# Patient Record
Sex: MALE | Race: WHITE | Employment: FULL TIME | URBAN - NONMETROPOLITAN AREA
[De-identification: names, ages, dates, MRNs, and addresses within clinical notes are randomized per-mention and may not be internally consistent; named-entity substitution may affect disease eponyms.]

---

## 2017-08-19 ENCOUNTER — NURSE ONLY (OUTPATIENT)
Dept: FAMILY MEDICINE CLINIC | Facility: CLINIC | Age: 37
End: 2017-08-19

## 2017-08-19 DIAGNOSIS — R79.89 ELEVATED TSH: ICD-10-CM

## 2017-08-19 LAB
FREE T4: 0.8 NG/DL (ref 0.9–1.8)
TSI SER-ACNC: 4.41 MIU/ML (ref 0.35–5.5)

## 2017-08-19 PROCEDURE — 36415 COLL VENOUS BLD VENIPUNCTURE: CPT | Performed by: FAMILY MEDICINE

## 2017-08-19 PROCEDURE — 84439 ASSAY OF FREE THYROXINE: CPT | Performed by: FAMILY MEDICINE

## 2017-08-19 PROCEDURE — 84443 ASSAY THYROID STIM HORMONE: CPT | Performed by: FAMILY MEDICINE

## 2017-08-21 DIAGNOSIS — R79.89 ELEVATED TSH: Primary | ICD-10-CM

## 2017-10-20 RX ORDER — ALBUTEROL SULFATE 90 UG/1
2 AEROSOL, METERED RESPIRATORY (INHALATION) EVERY 4 HOURS PRN
Qty: 1 INHALER | Refills: 0 | Status: SHIPPED | OUTPATIENT
Start: 2017-10-20 | End: 2017-12-09

## 2017-11-30 NOTE — TELEPHONE ENCOUNTER
Albuterol Sulfate HFA (PROAIR HFA) 108 (90 Base) MCG/ACT Inhalation Aero Soln   Call to [facility] in [place]

## 2017-12-01 RX ORDER — ALBUTEROL SULFATE 90 UG/1
2 AEROSOL, METERED RESPIRATORY (INHALATION) EVERY 4 HOURS PRN
Qty: 1 INHALER | Refills: 0 | OUTPATIENT
Start: 2017-12-01

## 2017-12-01 NOTE — TELEPHONE ENCOUNTER
Please advise patient that he needs an office visit. He has gone through 1 albuterol inhaler in a month.

## 2017-12-01 NOTE — TELEPHONE ENCOUNTER
REGGIE----spoke with pt. States he has been using the albuterol most days in the morning. He has ~1/2 left of the one that was refilled on 10/20/17, but was hoping to leave one at work and keep one at home---that is why he called for a refill now.   Advised he

## 2017-12-09 ENCOUNTER — OFFICE VISIT (OUTPATIENT)
Dept: FAMILY MEDICINE CLINIC | Facility: CLINIC | Age: 37
End: 2017-12-09

## 2017-12-09 VITALS
TEMPERATURE: 99 F | HEART RATE: 90 BPM | WEIGHT: 201.13 LBS | SYSTOLIC BLOOD PRESSURE: 128 MMHG | DIASTOLIC BLOOD PRESSURE: 82 MMHG | HEIGHT: 71 IN | BODY MASS INDEX: 28.16 KG/M2 | OXYGEN SATURATION: 98 %

## 2017-12-09 DIAGNOSIS — J30.1 CHRONIC SEASONAL ALLERGIC RHINITIS DUE TO POLLEN: ICD-10-CM

## 2017-12-09 DIAGNOSIS — J45.990 EXERCISE-INDUCED ASTHMA: Primary | ICD-10-CM

## 2017-12-09 PROCEDURE — 99213 OFFICE O/P EST LOW 20 MIN: CPT | Performed by: FAMILY MEDICINE

## 2017-12-09 RX ORDER — MONTELUKAST SODIUM 10 MG/1
10 TABLET ORAL NIGHTLY
Qty: 30 TABLET | Refills: 1 | Status: SHIPPED | OUTPATIENT
Start: 2017-12-09 | End: 2021-06-19 | Stop reason: ALTCHOICE

## 2017-12-09 RX ORDER — ALBUTEROL SULFATE 90 UG/1
2 AEROSOL, METERED RESPIRATORY (INHALATION) EVERY 4 HOURS PRN
Qty: 1 INHALER | Refills: 2 | Status: SHIPPED | OUTPATIENT
Start: 2017-12-09 | End: 2020-10-21

## 2017-12-09 NOTE — PROGRESS NOTES
HPI:    Patient ID: Bing Bullard is a 30year old male. Patient presents with:  Wheezing: Wheezing when breathing once a week. Nick Artis     HPI c/o intermittent wheezing at work with exposure to royal jelly powder and pollen, certain honey  Running , c/o wheezing lb 2 oz, SpO2 98 %.          ASSESSMENT/PLAN:   Exercise-induced asthma  (primary encounter diagnosis)  Chronic seasonal allergic rhinitis due to pollen  Patient Instructions   I reviewed pathophysiology of exercise-induced asthma as well as airway hyper re

## 2017-12-09 NOTE — PATIENT INSTRUCTIONS
I reviewed pathophysiology of exercise-induced asthma as well as airway hyper reactivity. Discussed allergen avoidance strategies. Discussed the addition of a controller medication.   Will start Singulair 10 mg nightly  Patient may use albuterol 2 puffs 3

## 2018-01-03 ENCOUNTER — CHARTING TRANS (OUTPATIENT)
Dept: URGENT CARE | Age: 38
End: 2018-01-03

## 2018-01-03 ENCOUNTER — MYAURORA ACCOUNT LINK (OUTPATIENT)
Dept: OTHER | Age: 38
End: 2018-01-03

## 2018-01-03 ASSESSMENT — PAIN SCALES - GENERAL: PAINLEVEL_OUTOF10: 0

## 2018-01-04 ENCOUNTER — CHARTING TRANS (OUTPATIENT)
Dept: OTHER | Age: 38
End: 2018-01-04

## 2018-01-09 ENCOUNTER — CHARTING TRANS (OUTPATIENT)
Dept: OTHER | Age: 38
End: 2018-01-09

## 2018-01-24 ENCOUNTER — CHARTING TRANS (OUTPATIENT)
Dept: OTHER | Age: 38
End: 2018-01-24

## 2018-01-31 ENCOUNTER — CHARTING TRANS (OUTPATIENT)
Dept: OTHER | Age: 38
End: 2018-01-31

## 2018-02-10 NOTE — PATIENT INSTRUCTIONS
I reviewed etiology of genital warts, possible recurrence, treatment options including risks, benefits, alternatives and side effects. Discussed importance of diligent observation. Discussed conservative stress management strategies.   We will have patien Most warts go away on their own in 1 to 2 years. But it’s important to consider treatment.  Treatment can:  · Make the warts go away sooner  · Lower the risk of spreading HPV to others  · Lower the risk for cervical cancer if you’re a woman  Warts can be tr The HPV virus is passed on to other people by having sex with someone who is infected. The risk of passing on the virus is greatest when you have warts. But there is a chance of spreading the virus even after treatment, when the wart can’t be seen.  Condoms

## 2018-02-10 NOTE — PROGRESS NOTES
Dante Yusuf is a 30year old male. Patient presents with:  Asthma: f/u    HPI:   2 1/2 months ago went to [employer], diagnosed with genital wart, very stressed, got it treated by dermatologist, patient states ever since that time he just \"does not feel n GENERAL: well developed, well nourished,in no apparent distress, well hydrated  SKIN: no rashes,no suspicious lesions  LUNGS: clear to auscultation, no w/r/r  CARDIO: RRR without murmur, peripheral pulses intact  GI: good BS's,no masses, HSM or tenderness Genital warts are caused by the human papillomavirus (HPV). HPV is the most common sexually transmitted disease in the [place] Most people who become infected with HPV will not have any warts, but they can still pass the virus on to another person.  HPV is pas You can get an HPV vaccine. The vaccine protects against certain types of HPV that cause genital warts and cervical cancer. Even though you have HPV now, being vaccinated could still help protect you from getting other types of HPV in the future.  This is t When you first learn that you have genital warts, you may feel guilty, angry, or emotionally upset. Getting the facts helps put you back in control. Follow up with your healthcare provider or your local public health department.  You can get a complete STD

## 2018-07-22 ENCOUNTER — MYAURORA ACCOUNT LINK (OUTPATIENT)
Dept: OTHER | Age: 38
End: 2018-07-22

## 2018-07-22 ENCOUNTER — LAB SERVICES (OUTPATIENT)
Dept: OTHER | Age: 38
End: 2018-07-22

## 2018-07-22 ENCOUNTER — CHARTING TRANS (OUTPATIENT)
Dept: OTHER | Age: 38
End: 2018-07-22

## 2018-07-22 ENCOUNTER — IMAGING SERVICES (OUTPATIENT)
Dept: OTHER | Age: 38
End: 2018-07-22

## 2018-07-22 LAB
ALBUMIN SERPL-MCNC: 4.5 G/DL (ref 3.6–5.1)
ALP SERPL-CCNC: 62 U/L (ref 45–115)
ALT SERPL-CCNC: 38 U/L (ref 17–47)
AST SERPL-CCNC: 43 U/L (ref 14–43)
BASOPHIL %: 0.7 % (ref 0–1.2)
BASOPHIL ABSOLUTE #: 0 10*3/UL (ref 0–0.1)
BILIRUB SERPL-MCNC: 0.4 MG/DL (ref 0–1.3)
BUN SERPL-MCNC: 14 MG/DL (ref 6–27)
CALCIUM SERPL-MCNC: 9.8 MG/DL (ref 8.6–10.6)
CHLORIDE SERPL-SCNC: 104 MMOL/L (ref 96–107)
CO2 SERPL-SCNC: 29 MMOL/L (ref 22–32)
CREAT SERPL-MCNC: 0.8 MG/DL (ref 0.6–1.6)
DIFFERENTIAL TYPE: NORMAL
EOSINOPHIL %: 7.4 % (ref 0–10)
EOSINOPHIL ABSOLUTE #: 0.4 10*3/UL (ref 0–0.5)
GFR SERPL CREATININE-BSD FRML MDRD: >60 ML/MIN/{1.73M2}
GFR SERPL CREATININE-BSD FRML MDRD: >60 ML/MIN/{1.73M2}
GLUCOSE SERPL-MCNC: 90 MG/DL (ref 70–200)
HEMATOCRIT: 45.5 % (ref 40–51)
HEMOGLOBIN: 15.1 G/DL (ref 13.7–17.5)
INFECTIOUS MONONUCLEOSIS SCRN: NEGATIVE
LDH SERPL P TO L-CCNC: 448 U/L (ref 313–618)
LYMPH PERCENT: 29.5 % (ref 20.5–51.1)
LYMPHOCYTE ABSOLUTE #: 1.6 10*3/UL (ref 1.2–3.4)
MEAN CORPUSCULAR HGB CONCENTRATION: 33.2 % (ref 32–36)
MEAN CORPUSCULAR HGB: 30.2 PG (ref 27–34)
MEAN CORPUSCULAR VOLUME: 91 FL (ref 79–95)
MEAN PLATELET VOLUME: 9.6 FL (ref 8.6–12.4)
MONOCYTE ABSOLUTE #: 0.4 10*3/UL (ref 0.2–0.9)
MONOCYTE PERCENT: 7.6 % (ref 4.3–12.9)
NEUTROPHIL ABSOLUTE #: 3.1 10*3/UL (ref 1.4–6.5)
NEUTROPHIL PERCENT: 54.8 % (ref 34–73.5)
PLATELET COUNT: 290 10*3/UL (ref 150–400)
POTASSIUM SERPL-SCNC: 5.1 MMOL/L (ref 3.5–5.3)
PROT SERPL-MCNC: 7.8 G/DL (ref 6.4–8.5)
RED BLOOD CELL COUNT: 5 10*6/UL (ref 3.9–5.7)
RED CELL DISTRIBUTION WIDTH: 12 % (ref 11.3–14.8)
SODIUM SERPL-SCNC: 144 MMOL/L (ref 136–146)
WHITE BLOOD CELL COUNT: 5.6 10*3/UL (ref 4–10)

## 2018-07-22 ASSESSMENT — PAIN SCALES - GENERAL: PAINLEVEL_OUTOF10: 0

## 2018-08-22 ENCOUNTER — CHARTING TRANS (OUTPATIENT)
Dept: OTHER | Age: 38
End: 2018-08-22

## 2018-09-15 ENCOUNTER — CHARTING TRANS (OUTPATIENT)
Dept: OTHER | Age: 38
End: 2018-09-15

## 2018-10-13 ENCOUNTER — CHARTING TRANS (OUTPATIENT)
Dept: OTHER | Age: 38
End: 2018-10-13

## 2018-10-13 ENCOUNTER — MYAURORA ACCOUNT LINK (OUTPATIENT)
Dept: OTHER | Age: 38
End: 2018-10-13

## 2018-11-14 ENCOUNTER — CHARTING TRANS (OUTPATIENT)
Dept: OTHER | Age: 38
End: 2018-11-14

## 2018-11-27 VITALS
RESPIRATION RATE: 16 BRPM | OXYGEN SATURATION: 100 % | HEART RATE: 64 BPM | TEMPERATURE: 97.7 F | SYSTOLIC BLOOD PRESSURE: 153 MMHG | DIASTOLIC BLOOD PRESSURE: 88 MMHG

## 2019-01-05 ENCOUNTER — OFFICE VISIT (OUTPATIENT)
Dept: DERMATOLOGY | Age: 39
End: 2019-01-05

## 2019-01-05 DIAGNOSIS — A63.0 CONDYLOMA ACUMINATUM: Primary | ICD-10-CM

## 2019-01-05 PROCEDURE — 99212 OFFICE O/P EST SF 10 MIN: CPT | Performed by: DERMATOLOGY

## 2019-01-05 PROCEDURE — 17110 DESTRUCTION B9 LES UP TO 14: CPT | Performed by: DERMATOLOGY

## 2019-01-05 RX ORDER — ALBUTEROL SULFATE 90 UG/1
2 AEROSOL, METERED RESPIRATORY (INHALATION)
COMMUNITY
Start: 2016-03-07

## 2019-03-05 VITALS
TEMPERATURE: 97.1 F | RESPIRATION RATE: 20 BRPM | SYSTOLIC BLOOD PRESSURE: 110 MMHG | HEART RATE: 63 BPM | DIASTOLIC BLOOD PRESSURE: 80 MMHG | OXYGEN SATURATION: 100 %

## 2019-04-27 ENCOUNTER — OFFICE VISIT (OUTPATIENT)
Dept: DERMATOLOGY | Age: 39
End: 2019-04-27

## 2019-04-27 DIAGNOSIS — L50.9 URTICARIA: Primary | ICD-10-CM

## 2019-04-27 PROCEDURE — 99213 OFFICE O/P EST LOW 20 MIN: CPT | Performed by: DERMATOLOGY

## 2019-04-27 RX ORDER — DESONIDE 0.5 MG/ML
LOTION TOPICAL DAILY
Qty: 118 ML | Refills: 2 | Status: SHIPPED | OUTPATIENT
Start: 2019-04-27

## 2019-07-01 ENCOUNTER — WALK IN (OUTPATIENT)
Dept: URGENT CARE | Age: 39
End: 2019-07-01

## 2019-07-01 DIAGNOSIS — R06.02 SOB (SHORTNESS OF BREATH): Primary | ICD-10-CM

## 2019-07-01 PROCEDURE — 93000 ELECTROCARDIOGRAM COMPLETE: CPT | Performed by: FAMILY MEDICINE

## 2019-07-01 PROCEDURE — 99213 OFFICE O/P EST LOW 20 MIN: CPT | Performed by: FAMILY MEDICINE

## 2019-07-01 ASSESSMENT — PAIN SCALES - GENERAL: PAINLEVEL: 5-6

## 2019-10-11 ENCOUNTER — OFFICE VISIT (OUTPATIENT)
Dept: DERMATOLOGY | Age: 39
End: 2019-10-11

## 2019-10-11 DIAGNOSIS — B07.8 OTHER VIRAL WARTS: Primary | ICD-10-CM

## 2019-10-11 PROCEDURE — 17110 DESTRUCTION B9 LES UP TO 14: CPT | Performed by: DERMATOLOGY

## 2019-10-11 PROCEDURE — 99213 OFFICE O/P EST LOW 20 MIN: CPT | Performed by: DERMATOLOGY

## 2020-05-22 ENCOUNTER — TELEPHONE (OUTPATIENT)
Dept: DERMATOLOGY | Age: 40
End: 2020-05-22

## 2020-06-10 ENCOUNTER — OFFICE VISIT (OUTPATIENT)
Dept: FAMILY MEDICINE CLINIC | Facility: CLINIC | Age: 40
End: 2020-06-10
Payer: COMMERCIAL

## 2020-06-10 VITALS
WEIGHT: 208 LBS | DIASTOLIC BLOOD PRESSURE: 76 MMHG | SYSTOLIC BLOOD PRESSURE: 120 MMHG | HEIGHT: 71 IN | OXYGEN SATURATION: 98 % | BODY MASS INDEX: 29.12 KG/M2 | HEART RATE: 83 BPM | TEMPERATURE: 98 F

## 2020-06-10 DIAGNOSIS — Z00.00 LABORATORY EXAM ORDERED AS PART OF ROUTINE GENERAL MEDICAL EXAMINATION: ICD-10-CM

## 2020-06-10 DIAGNOSIS — J45.990 EXERCISE-INDUCED ASTHMA: ICD-10-CM

## 2020-06-10 DIAGNOSIS — Z13.220 SCREENING, LIPID: ICD-10-CM

## 2020-06-10 DIAGNOSIS — Z86.19 HISTORY OF GENITAL WARTS: ICD-10-CM

## 2020-06-10 DIAGNOSIS — Z00.00 PREVENTATIVE HEALTH CARE: Primary | ICD-10-CM

## 2020-06-10 DIAGNOSIS — R79.89 ELEVATED TSH: ICD-10-CM

## 2020-06-10 DIAGNOSIS — F41.9 ANXIETY: ICD-10-CM

## 2020-06-10 DIAGNOSIS — Z13.0 SCREENING, ANEMIA, DEFICIENCY, IRON: ICD-10-CM

## 2020-06-10 PROCEDURE — 99395 PREV VISIT EST AGE 18-39: CPT | Performed by: FAMILY MEDICINE

## 2020-06-10 NOTE — PATIENT INSTRUCTIONS
I reviewed age-appropriate preventive health screening exams. I discussed HPV infection and genital warts as well as infection control measures. I reassured patient that I see no evidence of genital warts at this time.   I did offer him topical medication

## 2020-06-10 NOTE — H&P
Yi Mason is a 42year old male who presents for a complete physical exam.   HPI:   Pt voices concerns of recurrent genital warts. Sees derm and gets freezing  C/o left shoulder pain x 1 yr  Wants a blood test for vitamin levels  Anxiety, mainly focused feels well, no fatigue, denies excessive daytime drowsiness, good appetite  SKIN: denies any unusual skin lesions or rashes, see hpi  EYES:denies blurred vision or double vision  ENT: denies nasal congestion, PND or ST, denies snoring and reported periods edema, FROM of all joints tested  Left shoulder: Restricted flexion at 160 degrees  Targeted activation techniques provided and flexion improved to 180 degrees  NEURO: A &O X 3,cranial nerves are intact,motor and sensory are grossly intact, DTRs +2/4 UE/LE rescue inhaler prior to exercise    Oumou Ibarra.  Ayana Cardoso, FAAFP

## 2020-06-16 ENCOUNTER — TELEPHONE (OUTPATIENT)
Dept: DERMATOLOGY | Age: 40
End: 2020-06-16

## 2020-06-20 ENCOUNTER — APPOINTMENT (OUTPATIENT)
Dept: DERMATOLOGY | Age: 40
End: 2020-06-20

## 2020-07-02 ENCOUNTER — LABORATORY ENCOUNTER (OUTPATIENT)
Dept: LAB | Age: 40
End: 2020-07-02
Attending: FAMILY MEDICINE
Payer: COMMERCIAL

## 2020-07-02 DIAGNOSIS — Z13.220 SCREENING, LIPID: ICD-10-CM

## 2020-07-02 DIAGNOSIS — R79.89 ELEVATED TSH: ICD-10-CM

## 2020-07-02 DIAGNOSIS — Z13.0 SCREENING, ANEMIA, DEFICIENCY, IRON: ICD-10-CM

## 2020-07-02 DIAGNOSIS — Z00.00 LABORATORY EXAM ORDERED AS PART OF ROUTINE GENERAL MEDICAL EXAMINATION: ICD-10-CM

## 2020-07-02 LAB
ALBUMIN SERPL-MCNC: 4.2 G/DL (ref 3.4–5)
ALBUMIN/GLOB SERPL: 1.2 {RATIO} (ref 1–2)
ALP LIVER SERPL-CCNC: 62 U/L (ref 45–117)
ALT SERPL-CCNC: 31 U/L (ref 16–61)
ANION GAP SERPL CALC-SCNC: 4 MMOL/L (ref 0–18)
AST SERPL-CCNC: 25 U/L (ref 15–37)
BASOPHILS # BLD AUTO: 0.05 X10(3) UL (ref 0–0.2)
BASOPHILS NFR BLD AUTO: 1 %
BILIRUB SERPL-MCNC: 0.6 MG/DL (ref 0.1–2)
BUN BLD-MCNC: 12 MG/DL (ref 7–18)
BUN/CREAT SERPL: 12 (ref 10–20)
CALCIUM BLD-MCNC: 9.4 MG/DL (ref 8.5–10.1)
CHLORIDE SERPL-SCNC: 106 MMOL/L (ref 98–112)
CHOLEST SMN-MCNC: 208 MG/DL (ref ?–200)
CO2 SERPL-SCNC: 27 MMOL/L (ref 21–32)
CREAT BLD-MCNC: 1 MG/DL (ref 0.7–1.3)
DEPRECATED RDW RBC AUTO: 39.6 FL (ref 35.1–46.3)
EOSINOPHIL # BLD AUTO: 0.25 X10(3) UL (ref 0–0.7)
EOSINOPHIL NFR BLD AUTO: 5.2 %
ERYTHROCYTE [DISTWIDTH] IN BLOOD BY AUTOMATED COUNT: 11.8 % (ref 11–15)
GLOBULIN PLAS-MCNC: 3.6 G/DL (ref 2.8–4.4)
GLUCOSE BLD-MCNC: 75 MG/DL (ref 70–99)
HCT VFR BLD AUTO: 42.3 % (ref 39–53)
HDLC SERPL-MCNC: 47 MG/DL (ref 40–59)
HGB BLD-MCNC: 14.3 G/DL (ref 13–17.5)
IMM GRANULOCYTES # BLD AUTO: 0.01 X10(3) UL (ref 0–1)
IMM GRANULOCYTES NFR BLD: 0.2 %
LDLC SERPL CALC-MCNC: 147 MG/DL (ref ?–100)
LYMPHOCYTES # BLD AUTO: 1.95 X10(3) UL (ref 1–4)
LYMPHOCYTES NFR BLD AUTO: 40.3 %
M PROTEIN MFR SERPL ELPH: 7.8 G/DL (ref 6.4–8.2)
MCH RBC QN AUTO: 31.2 PG (ref 26–34)
MCHC RBC AUTO-ENTMCNC: 33.8 G/DL (ref 31–37)
MCV RBC AUTO: 92.2 FL (ref 80–100)
MONOCYTES # BLD AUTO: 0.37 X10(3) UL (ref 0.1–1)
MONOCYTES NFR BLD AUTO: 7.6 %
NEUTROPHILS # BLD AUTO: 2.21 X10 (3) UL (ref 1.5–7.7)
NEUTROPHILS # BLD AUTO: 2.21 X10(3) UL (ref 1.5–7.7)
NEUTROPHILS NFR BLD AUTO: 45.7 %
NONHDLC SERPL-MCNC: 161 MG/DL (ref ?–130)
OSMOLALITY SERPL CALC.SUM OF ELEC: 282 MOSM/KG (ref 275–295)
PATIENT FASTING Y/N/NP: YES
PATIENT FASTING Y/N/NP: YES
PLATELET # BLD AUTO: 308 10(3)UL (ref 150–450)
POTASSIUM SERPL-SCNC: 4 MMOL/L (ref 3.5–5.1)
RBC # BLD AUTO: 4.59 X10(6)UL (ref 4.3–5.7)
SODIUM SERPL-SCNC: 137 MMOL/L (ref 136–145)
T4 FREE SERPL-MCNC: 0.8 NG/DL (ref 0.8–1.7)
TRIGL SERPL-MCNC: 69 MG/DL (ref 30–149)
TSI SER-ACNC: 11 MIU/ML (ref 0.36–3.74)
VLDLC SERPL CALC-MCNC: 14 MG/DL (ref 0–30)
WBC # BLD AUTO: 4.8 X10(3) UL (ref 4–11)

## 2020-07-02 PROCEDURE — 80061 LIPID PANEL: CPT | Performed by: FAMILY MEDICINE

## 2020-07-02 PROCEDURE — 36415 COLL VENOUS BLD VENIPUNCTURE: CPT | Performed by: FAMILY MEDICINE

## 2020-07-02 PROCEDURE — 80050 GENERAL HEALTH PANEL: CPT | Performed by: FAMILY MEDICINE

## 2020-07-02 PROCEDURE — 84439 ASSAY OF FREE THYROXINE: CPT | Performed by: FAMILY MEDICINE

## 2020-07-06 ENCOUNTER — TELEPHONE (OUTPATIENT)
Dept: FAMILY MEDICINE CLINIC | Facility: CLINIC | Age: 40
End: 2020-07-06

## 2020-07-06 DIAGNOSIS — R79.89 ELEVATED TSH: Primary | ICD-10-CM

## 2020-07-06 RX ORDER — LEVOTHYROXINE SODIUM 0.05 MG/1
50 TABLET ORAL
Qty: 90 TABLET | Refills: 0 | Status: SHIPPED | OUTPATIENT
Start: 2020-07-06 | End: 2020-10-01

## 2020-07-06 NOTE — TELEPHONE ENCOUNTER
Left message for pt to call back for results.     Chemistries are all normal, lipids are creeping up, this should improve w/ 10-15# wt loss   Blood count normal but thyroid function declining into hypothyroid range, I would recommend starting replacement th

## 2020-09-26 ENCOUNTER — NURSE ONLY (OUTPATIENT)
Dept: FAMILY MEDICINE CLINIC | Facility: CLINIC | Age: 40
End: 2020-09-26
Payer: COMMERCIAL

## 2020-09-26 DIAGNOSIS — R79.89 ELEVATED TSH: ICD-10-CM

## 2020-09-26 PROCEDURE — 84439 ASSAY OF FREE THYROXINE: CPT | Performed by: FAMILY MEDICINE

## 2020-09-26 PROCEDURE — 84443 ASSAY THYROID STIM HORMONE: CPT | Performed by: FAMILY MEDICINE

## 2020-09-26 PROCEDURE — 36415 COLL VENOUS BLD VENIPUNCTURE: CPT | Performed by: FAMILY MEDICINE

## 2020-09-28 DIAGNOSIS — R79.89 ELEVATED TSH: Primary | ICD-10-CM

## 2020-10-01 RX ORDER — LEVOTHYROXINE SODIUM 0.05 MG/1
TABLET ORAL
Qty: 90 TABLET | Refills: 3 | Status: SHIPPED | OUTPATIENT
Start: 2020-10-01 | End: 2021-11-01

## 2020-10-01 NOTE — TELEPHONE ENCOUNTER
Last refill #90 on 7/6/2020  Last office visit on 6/10/2020  No future appointments.   Labs current until 9/2021  Refilled per protocol

## 2020-10-20 ENCOUNTER — PATIENT MESSAGE (OUTPATIENT)
Dept: FAMILY MEDICINE CLINIC | Facility: CLINIC | Age: 40
End: 2020-10-20

## 2020-10-20 NOTE — TELEPHONE ENCOUNTER
From: Paulie Price  To: Miya Adam. Hiro Lackey DO  Sent: 10/20/2020 5:07 PM CDT  Subject: Prescription Question    Hello,     Could I please get a refill on my pro air albuterol sulfate inhaler. I don't use it often but it comes in handy this time of year.  Sparkle Ferreira

## 2020-10-21 RX ORDER — ALBUTEROL SULFATE 90 UG/1
2 AEROSOL, METERED RESPIRATORY (INHALATION) EVERY 4 HOURS PRN
Qty: 1 INHALER | Refills: 2 | Status: SHIPPED | OUTPATIENT
Start: 2020-10-21 | End: 2021-08-02

## 2020-12-22 ENCOUNTER — HOSPITAL ENCOUNTER (OUTPATIENT)
Dept: GENERAL RADIOLOGY | Age: 40
Discharge: HOME | End: 2020-12-22
Attending: FAMILY MEDICINE
Payer: COMMERCIAL

## 2020-12-22 DIAGNOSIS — M25.512 LEFT SHOULDER PAIN, UNSPECIFIED CHRONICITY: ICD-10-CM

## 2020-12-22 PROCEDURE — 73030 X-RAY EXAM OF SHOULDER: CPT | Performed by: FAMILY MEDICINE

## 2021-01-08 ENCOUNTER — TELEPHONE (OUTPATIENT)
Dept: FAMILY MEDICINE CLINIC | Facility: CLINIC | Age: 41
End: 2021-01-08

## 2021-01-08 NOTE — TELEPHONE ENCOUNTER
RECORDS REQUEST RECEIVED 1/8/2021  FOR MOST RECENT XRAY REPORT 12/22/2020   FAX [phone]        REPORT PRINTED AND FAXED

## 2021-01-27 ENCOUNTER — TELEPHONE (OUTPATIENT)
Dept: FAMILY MEDICINE CLINIC | Facility: CLINIC | Age: 41
End: 2021-01-27

## 2021-01-27 ENCOUNTER — OFFICE VISIT (OUTPATIENT)
Dept: FAMILY MEDICINE CLINIC | Facility: CLINIC | Age: 41
End: 2021-01-27
Payer: COMMERCIAL

## 2021-01-27 VITALS
BODY MASS INDEX: 29.26 KG/M2 | HEART RATE: 70 BPM | TEMPERATURE: 98 F | DIASTOLIC BLOOD PRESSURE: 76 MMHG | WEIGHT: 209 LBS | HEIGHT: 71 IN | OXYGEN SATURATION: 98 % | SYSTOLIC BLOOD PRESSURE: 120 MMHG

## 2021-01-27 DIAGNOSIS — H00.14 CHALAZION OF LEFT UPPER EYELID: Primary | ICD-10-CM

## 2021-01-27 PROCEDURE — 3078F DIAST BP <80 MM HG: CPT | Performed by: FAMILY MEDICINE

## 2021-01-27 PROCEDURE — 3074F SYST BP LT 130 MM HG: CPT | Performed by: FAMILY MEDICINE

## 2021-01-27 PROCEDURE — 3008F BODY MASS INDEX DOCD: CPT | Performed by: FAMILY MEDICINE

## 2021-01-27 PROCEDURE — 99213 OFFICE O/P EST LOW 20 MIN: CPT | Performed by: FAMILY MEDICINE

## 2021-01-27 RX ORDER — DOXYCYCLINE HYCLATE 100 MG/1
100 CAPSULE ORAL 2 TIMES DAILY
Qty: 14 CAPSULE | Refills: 0 | Status: SHIPPED | OUTPATIENT
Start: 2021-01-27 | End: 2021-02-03

## 2021-01-27 RX ORDER — CEPHALEXIN 500 MG/1
500 CAPSULE ORAL 3 TIMES DAILY
Qty: 21 CAPSULE | Refills: 0 | Status: SHIPPED | OUTPATIENT
Start: 2021-01-27 | End: 2021-01-27 | Stop reason: SINTOL

## 2021-01-27 NOTE — PROGRESS NOTES
HPI:    Patient ID: Hans Romero is a 47year old male.     Patient presents with:  Eyelid Swelling: left eye x 1 week    Left upper lid swollen x 1 week, pt has been using warm compresses, no DC, no pain  No uri symptoms    Past Medical History:   Diagnos Blood pressure 120/76, pulse 70, temperature 97.6 °F (36.4 °C), temperature source Temporal, height 5' 11\" (1.803 m), weight 209 lb (94.8 kg), SpO2 98 %.          ASSESSMENT/PLAN:   Chalazion of left upper eyelid  (primary encounter diagnosis)  Patient Ins

## 2021-01-27 NOTE — TELEPHONE ENCOUNTER
Please advise patient that I am surprised the cephalexin caused nausea however I certainly agree with his decision not to take another dose if it made him sick.   We will ricardo chart as an intolerance to cephalexin  Would recommend no other medications today

## 2021-01-27 NOTE — TELEPHONE ENCOUNTER
The medicine that was prescribed today is making him sick. He only took one and is nervous to take anymore.   He said that you can leave a message because he is at work

## 2021-01-27 NOTE — PATIENT INSTRUCTIONS
Recommend continued warm compresses 3-4 times daily  Cephalexin 500 mg 3 times daily x7 days  Follow-up as needed

## 2021-02-17 ENCOUNTER — PATIENT MESSAGE (OUTPATIENT)
Dept: FAMILY MEDICINE CLINIC | Facility: CLINIC | Age: 41
End: 2021-02-17

## 2021-02-17 RX ORDER — ERYTHROMYCIN 5 MG/G
1 OINTMENT OPHTHALMIC 3 TIMES DAILY
Qty: 1 G | Refills: 0 | Status: SHIPPED | OUTPATIENT
Start: 2021-02-17 | End: 2021-02-22

## 2021-02-17 NOTE — TELEPHONE ENCOUNTER
From: Yi Mason  To: Mario Coe.  Mayank Listen, DO  Sent: 2/17/2021 12:05 PM CST  Subject: Non-Urgent Medical Question    Hello,    I finished the medication that was given for my eyelid and just wanted to let you know that my left eyelid is still red and puf

## 2021-02-17 NOTE — TELEPHONE ENCOUNTER
Have patient continue warm compresses, start erythromycin ointment 3 times daily to affected eye x5 days

## 2021-05-25 VITALS
OXYGEN SATURATION: 98 % | SYSTOLIC BLOOD PRESSURE: 122 MMHG | RESPIRATION RATE: 16 BRPM | TEMPERATURE: 98.3 F | DIASTOLIC BLOOD PRESSURE: 82 MMHG | HEART RATE: 62 BPM

## 2021-05-31 ENCOUNTER — OFFICE VISIT (OUTPATIENT)
Dept: FAMILY MEDICINE CLINIC | Facility: CLINIC | Age: 41
End: 2021-05-31
Payer: COMMERCIAL

## 2021-05-31 VITALS
HEIGHT: 71 IN | BODY MASS INDEX: 28 KG/M2 | SYSTOLIC BLOOD PRESSURE: 126 MMHG | RESPIRATION RATE: 16 BRPM | OXYGEN SATURATION: 99 % | WEIGHT: 200 LBS | TEMPERATURE: 98 F | DIASTOLIC BLOOD PRESSURE: 66 MMHG | HEART RATE: 75 BPM

## 2021-05-31 DIAGNOSIS — J01.00 ACUTE NON-RECURRENT MAXILLARY SINUSITIS: Primary | ICD-10-CM

## 2021-05-31 PROCEDURE — 3074F SYST BP LT 130 MM HG: CPT | Performed by: NURSE PRACTITIONER

## 2021-05-31 PROCEDURE — 3008F BODY MASS INDEX DOCD: CPT | Performed by: NURSE PRACTITIONER

## 2021-05-31 PROCEDURE — 3078F DIAST BP <80 MM HG: CPT | Performed by: NURSE PRACTITIONER

## 2021-05-31 PROCEDURE — 99213 OFFICE O/P EST LOW 20 MIN: CPT | Performed by: NURSE PRACTITIONER

## 2021-05-31 RX ORDER — PREDNISONE 20 MG/1
40 TABLET ORAL DAILY
Qty: 10 TABLET | Refills: 0 | Status: SHIPPED | OUTPATIENT
Start: 2021-05-31 | End: 2021-06-05

## 2021-05-31 NOTE — PATIENT INSTRUCTIONS
Use zyrtec or xyzal daily  Use flonase daily  Follow up with pcp if not improving      Sinusitis (No Antibiotics)    The sinuses are air-filled spaces in the bones of the face.  They connect to the inside of the nose. Sinusitis is redness and swelling (in pressure.)  · Over-the-counter antihistamines or steroid nasal sprays, if advised by the healthcare provider, may help if allergies helped cause your sinusitis.   · Use acetaminophen or ibuprofen to control pain, unless another pain medicine was prescribed

## 2021-06-04 ENCOUNTER — TELEPHONE (OUTPATIENT)
Dept: FAMILY MEDICINE CLINIC | Facility: CLINIC | Age: 41
End: 2021-06-04

## 2021-06-04 NOTE — TELEPHONE ENCOUNTER
I would not recommend oral steroids. I would recommend cool compresses, artificial tears as a wetting solution which should help improve the tear ducts  We do tear duct massages which is basically rubbing the inside of of the nose next to the eye.   If sym

## 2021-06-04 NOTE — TELEPHONE ENCOUNTER
Patient received Prednisone / steroids for his eyes last week. He is in [place] right now. His eyes are really dry, Eyekids are puffed over and tear ducts are clogged, Should he start taking the prednisone again?

## 2021-06-19 ENCOUNTER — OFFICE VISIT (OUTPATIENT)
Dept: FAMILY MEDICINE CLINIC | Facility: CLINIC | Age: 41
End: 2021-06-19
Payer: COMMERCIAL

## 2021-06-19 VITALS
BODY MASS INDEX: 28.28 KG/M2 | OXYGEN SATURATION: 99 % | DIASTOLIC BLOOD PRESSURE: 80 MMHG | HEIGHT: 71 IN | SYSTOLIC BLOOD PRESSURE: 116 MMHG | RESPIRATION RATE: 18 BRPM | TEMPERATURE: 98 F | HEART RATE: 70 BPM | WEIGHT: 202 LBS

## 2021-06-19 DIAGNOSIS — H04.123 DRY EYES, BILATERAL: ICD-10-CM

## 2021-06-19 DIAGNOSIS — E03.9 ACQUIRED HYPOTHYROIDISM: ICD-10-CM

## 2021-06-19 DIAGNOSIS — Z00.00 PREVENTATIVE HEALTH CARE: Primary | ICD-10-CM

## 2021-06-19 DIAGNOSIS — M75.22 BICEPS TENDINITIS OF LEFT UPPER EXTREMITY: ICD-10-CM

## 2021-06-19 DIAGNOSIS — Z30.09 STERILIZATION EDUCATION: ICD-10-CM

## 2021-06-19 DIAGNOSIS — Z51.81 MEDICATION MONITORING ENCOUNTER: ICD-10-CM

## 2021-06-19 DIAGNOSIS — M75.82 ROTATOR CUFF TENDINITIS, LEFT: ICD-10-CM

## 2021-06-19 DIAGNOSIS — J45.990 EXERCISE-INDUCED ASTHMA: ICD-10-CM

## 2021-06-19 DIAGNOSIS — Z00.00 LABORATORY EXAM ORDERED AS PART OF ROUTINE GENERAL MEDICAL EXAMINATION: ICD-10-CM

## 2021-06-19 DIAGNOSIS — E78.00 HYPERCHOLESTEREMIA: ICD-10-CM

## 2021-06-19 DIAGNOSIS — J30.1 SEASONAL ALLERGIC RHINITIS DUE TO POLLEN: ICD-10-CM

## 2021-06-19 DIAGNOSIS — Z86.19 HISTORY OF GENITAL WARTS: ICD-10-CM

## 2021-06-19 PROCEDURE — 80053 COMPREHEN METABOLIC PANEL: CPT | Performed by: FAMILY MEDICINE

## 2021-06-19 PROCEDURE — 3008F BODY MASS INDEX DOCD: CPT | Performed by: FAMILY MEDICINE

## 2021-06-19 PROCEDURE — 3079F DIAST BP 80-89 MM HG: CPT | Performed by: FAMILY MEDICINE

## 2021-06-19 PROCEDURE — 84443 ASSAY THYROID STIM HORMONE: CPT | Performed by: FAMILY MEDICINE

## 2021-06-19 PROCEDURE — 99395 PREV VISIT EST AGE 18-39: CPT | Performed by: FAMILY MEDICINE

## 2021-06-19 PROCEDURE — 84439 ASSAY OF FREE THYROXINE: CPT | Performed by: FAMILY MEDICINE

## 2021-06-19 PROCEDURE — 80061 LIPID PANEL: CPT | Performed by: FAMILY MEDICINE

## 2021-06-19 PROCEDURE — 3074F SYST BP LT 130 MM HG: CPT | Performed by: FAMILY MEDICINE

## 2021-06-19 NOTE — PATIENT INSTRUCTIONS
1. Preventative health care  I discussed age-appropriate preventive skin exams as well as immunizations. 2. Acquired hypothyroidism  Continue daily thyroid replacement therapy, check labs  - VENIPUNCTURE  - TSH+FREE T4; Future  - TSH+FREE T4    3.  Exerc

## 2021-06-19 NOTE — H&P
Ann Del Castillo is a 47year old male who presents for a complete physical exam.   HPI:   See ROS  Would like referral for vasectomy  Wt Readings from Last 6 Encounters:  06/19/21 : 202 lb (91.6 kg)  05/31/21 : 200 lb (90.7 kg)  01/27/21 : 209 lb (94.8 kg) Exercise: active at work, walks the dog. 4 x weekly [employer]  Diet: watches minimally,      REVIEW OF SYSTEMS:   GENERAL: generally feels well, no fatigue, denies excessive daytime drowsiness, good appetite  SKIN: denies any unusual skin lesions or rashes  EY no S3, S4, strong peripheral pulses, no peripheral edema  GI: +NBS's,no masses, HSM or tenderness  : two descended testes,no masses,no hernia,no penile lesions, no warts, ulcers or other lesions, no inguinal lymphadenopathy  BACK:  : FROM, No lateral cur Laboratory exam ordered as part of routine general medical examination    - VENIPUNCTURE  - COMP METABOLIC PANEL (14); Future  - COMP METABOLIC PANEL (14)    6. Hypercholesteremia  Reviewed goals for lipids. - VENIPUNCTURE  - LIPID PANEL;  Future  - LIPID

## 2021-08-02 RX ORDER — ALBUTEROL SULFATE 90 UG/1
2 AEROSOL, METERED RESPIRATORY (INHALATION) EVERY 4 HOURS PRN
Qty: 8.5 G | Refills: 0 | Status: SHIPPED | OUTPATIENT
Start: 2021-08-02

## 2021-11-01 RX ORDER — LEVOTHYROXINE SODIUM 0.05 MG/1
50 TABLET ORAL
Qty: 90 TABLET | Refills: 3 | Status: SHIPPED | OUTPATIENT
Start: 2021-11-01

## 2021-11-01 NOTE — TELEPHONE ENCOUNTER
LOV 06/19/2021    LAST LAB 06/19/2021    LAST RX 10/01/2020 90 tablets 3 refills    Next OV No future appointments.     PROTOCOL  Thyroid Supplements Protocol Passed 11/01/2021 03:44 PM   Protocol Details  TSH test in past 12 months    TSH value between 0.3

## 2022-01-27 ENCOUNTER — TELEPHONE (OUTPATIENT)
Dept: FAMILY MEDICINE CLINIC | Facility: CLINIC | Age: 42
End: 2022-01-27

## 2022-01-27 NOTE — TELEPHONE ENCOUNTER
Brenda Regan is calling he is wondering if Dr Karthik Mccauley would order a thyroid test for him, he has cut some stuff out of his diet and he is wanting to try and get off the levothyroxine.   Please call please leave a detailed message if he does not answer

## 2022-01-27 NOTE — TELEPHONE ENCOUNTER
Spoke with pt. His last TSH was done on 6/19/21 and pt was told to repeat it 1 year later. Pt asks if he can have it rechecked now \"in hopes he can get off levothyroxine\".   States he has made dietary changes (cut out soda, decreased alcohol, stopped

## 2022-01-31 NOTE — TELEPHONE ENCOUNTER
If patient wants to see whether or not he needs thyroid medication, checking it while he is on medication is only going to determine if he is on a therapeutic dose.   If he simply wants to stop the medication and recheck his TSH and free T4 in 2 to 3 months

## 2022-02-09 ENCOUNTER — HOSPITAL ENCOUNTER (OUTPATIENT)
Age: 42
Discharge: HOME | End: 2022-02-09
Payer: COMMERCIAL

## 2022-02-09 VITALS
HEART RATE: 71 BPM | RESPIRATION RATE: 16 BRPM | OXYGEN SATURATION: 98 % | DIASTOLIC BLOOD PRESSURE: 70 MMHG | HEIGHT: 71 IN | SYSTOLIC BLOOD PRESSURE: 132 MMHG | WEIGHT: 198 LBS | TEMPERATURE: 98 F | BODY MASS INDEX: 27.72 KG/M2

## 2022-02-09 DIAGNOSIS — R53.83 FATIGUE: Primary | ICD-10-CM

## 2022-02-09 DIAGNOSIS — Z20.822 CLOSE EXPOSURE TO COVID-19 VIRUS: ICD-10-CM

## 2022-02-09 LAB — SARS-COV-2 RNA RESP QL NAA+PROBE: NOT DETECTED

## 2022-02-09 PROCEDURE — U0002 COVID-19 LAB TEST NON-CDC: HCPCS | Performed by: PHYSICIAN ASSISTANT

## 2022-02-09 PROCEDURE — 99213 OFFICE O/P EST LOW 20 MIN: CPT | Performed by: PHYSICIAN ASSISTANT

## 2022-02-09 RX ORDER — PREDNISOLONE ACETATE 10 MG/ML
SUSPENSION/ DROPS OPHTHALMIC 4 TIMES DAILY
COMMUNITY
End: 2022-03-15 | Stop reason: ALTCHOICE

## 2022-03-09 ENCOUNTER — PATIENT MESSAGE (OUTPATIENT)
Dept: FAMILY MEDICINE CLINIC | Facility: CLINIC | Age: 42
End: 2022-03-09

## 2022-03-12 ENCOUNTER — APPOINTMENT (OUTPATIENT)
Dept: DERMATOLOGY | Age: 42
End: 2022-03-12

## 2022-03-15 ENCOUNTER — OFFICE VISIT (OUTPATIENT)
Dept: FAMILY MEDICINE CLINIC | Facility: CLINIC | Age: 42
End: 2022-03-15
Payer: COMMERCIAL

## 2022-03-15 VITALS
TEMPERATURE: 98 F | DIASTOLIC BLOOD PRESSURE: 70 MMHG | BODY MASS INDEX: 28.28 KG/M2 | SYSTOLIC BLOOD PRESSURE: 120 MMHG | WEIGHT: 202 LBS | HEIGHT: 71 IN | RESPIRATION RATE: 16 BRPM | HEART RATE: 83 BPM | OXYGEN SATURATION: 98 %

## 2022-03-15 DIAGNOSIS — G56.90 AXILLARY NEUROPATHY: ICD-10-CM

## 2022-03-15 DIAGNOSIS — L72.3 SEBACEOUS CYST: Primary | ICD-10-CM

## 2022-03-15 DIAGNOSIS — M75.22 BICEPS TENDONITIS ON LEFT: ICD-10-CM

## 2022-03-15 PROCEDURE — 99213 OFFICE O/P EST LOW 20 MIN: CPT | Performed by: FAMILY MEDICINE

## 2022-03-15 PROCEDURE — 3008F BODY MASS INDEX DOCD: CPT | Performed by: FAMILY MEDICINE

## 2022-03-15 PROCEDURE — 3078F DIAST BP <80 MM HG: CPT | Performed by: FAMILY MEDICINE

## 2022-03-15 PROCEDURE — 3074F SYST BP LT 130 MM HG: CPT | Performed by: FAMILY MEDICINE

## 2022-03-15 NOTE — PATIENT INSTRUCTIONS
I advised patient of the benign nature of the lip lesion and limited treatment options. I recommend he follow-up with his dermatologist as planned  Discussed diagnostic considerations for his chronic left axillary nerve pain. Follow-up with orthopedics as scheduled.   I advised patient I suspect that the will likely schedule a cervical MRI

## 2022-03-18 ENCOUNTER — MED REC SCAN ONLY (OUTPATIENT)
Dept: FAMILY MEDICINE CLINIC | Facility: CLINIC | Age: 42
End: 2022-03-18

## 2022-04-18 ENCOUNTER — MED REC SCAN ONLY (OUTPATIENT)
Dept: FAMILY MEDICINE CLINIC | Facility: CLINIC | Age: 42
End: 2022-04-18

## 2022-05-25 ENCOUNTER — MED REC SCAN ONLY (OUTPATIENT)
Dept: FAMILY MEDICINE CLINIC | Facility: CLINIC | Age: 42
End: 2022-05-25

## 2022-06-08 ENCOUNTER — TELEPHONE (OUTPATIENT)
Dept: FAMILY MEDICINE CLINIC | Facility: CLINIC | Age: 42
End: 2022-06-08

## 2022-07-05 ENCOUNTER — OFFICE VISIT (OUTPATIENT)
Dept: FAMILY MEDICINE CLINIC | Facility: CLINIC | Age: 42
End: 2022-07-05
Payer: COMMERCIAL

## 2022-07-05 ENCOUNTER — LABORATORY ENCOUNTER (OUTPATIENT)
Dept: LAB | Age: 42
End: 2022-07-05
Attending: FAMILY MEDICINE
Payer: COMMERCIAL

## 2022-07-05 VITALS
WEIGHT: 204 LBS | HEART RATE: 68 BPM | OXYGEN SATURATION: 99 % | HEIGHT: 71 IN | BODY MASS INDEX: 28.56 KG/M2 | RESPIRATION RATE: 16 BRPM | TEMPERATURE: 98 F | DIASTOLIC BLOOD PRESSURE: 80 MMHG | SYSTOLIC BLOOD PRESSURE: 120 MMHG

## 2022-07-05 DIAGNOSIS — J45.990 EXERCISE-INDUCED ASTHMA: ICD-10-CM

## 2022-07-05 DIAGNOSIS — M75.22 BICEPS TENDONITIS ON LEFT: ICD-10-CM

## 2022-07-05 DIAGNOSIS — Z01.818 PRE-OP EVALUATION: ICD-10-CM

## 2022-07-05 DIAGNOSIS — E03.9 ACQUIRED HYPOTHYROIDISM: ICD-10-CM

## 2022-07-05 DIAGNOSIS — Z01.818 PRE-OP EVALUATION: Primary | ICD-10-CM

## 2022-07-05 PROBLEM — U07.1 COVID-19 VIRUS INFECTION: Status: ACTIVE | Noted: 2022-06-03

## 2022-07-05 LAB
ANION GAP SERPL CALC-SCNC: 3 MMOL/L (ref 0–18)
BASOPHILS # BLD AUTO: 0.06 X10(3) UL (ref 0–0.2)
BASOPHILS NFR BLD AUTO: 1 %
BUN BLD-MCNC: 10 MG/DL (ref 7–18)
CALCIUM BLD-MCNC: 9.8 MG/DL (ref 8.5–10.1)
CHLORIDE SERPL-SCNC: 105 MMOL/L (ref 98–112)
CO2 SERPL-SCNC: 32 MMOL/L (ref 21–32)
CREAT BLD-MCNC: 1.02 MG/DL
EOSINOPHIL # BLD AUTO: 0.52 X10(3) UL (ref 0–0.7)
EOSINOPHIL NFR BLD AUTO: 8.4 %
ERYTHROCYTE [DISTWIDTH] IN BLOOD BY AUTOMATED COUNT: 12.2 %
FASTING STATUS PATIENT QL REPORTED: NO
GLUCOSE BLD-MCNC: 122 MG/DL (ref 70–99)
HCT VFR BLD AUTO: 42.7 %
HGB BLD-MCNC: 13.9 G/DL
IMM GRANULOCYTES # BLD AUTO: 0.01 X10(3) UL (ref 0–1)
IMM GRANULOCYTES NFR BLD: 0.2 %
LYMPHOCYTES # BLD AUTO: 2.35 X10(3) UL (ref 1–4)
LYMPHOCYTES NFR BLD AUTO: 37.8 %
MCH RBC QN AUTO: 31.1 PG (ref 26–34)
MCHC RBC AUTO-ENTMCNC: 32.6 G/DL (ref 31–37)
MCV RBC AUTO: 95.5 FL
MONOCYTES # BLD AUTO: 0.36 X10(3) UL (ref 0.1–1)
MONOCYTES NFR BLD AUTO: 5.8 %
NEUTROPHILS # BLD AUTO: 2.92 X10 (3) UL (ref 1.5–7.7)
NEUTROPHILS # BLD AUTO: 2.92 X10(3) UL (ref 1.5–7.7)
NEUTROPHILS NFR BLD AUTO: 46.8 %
OSMOLALITY SERPL CALC.SUM OF ELEC: 290 MOSM/KG (ref 275–295)
PLATELET # BLD AUTO: 321 10(3)UL (ref 150–450)
POTASSIUM SERPL-SCNC: 3.5 MMOL/L (ref 3.5–5.1)
RBC # BLD AUTO: 4.47 X10(6)UL
SODIUM SERPL-SCNC: 140 MMOL/L (ref 136–145)
T4 FREE SERPL-MCNC: 0.9 NG/DL (ref 0.8–1.7)
TSI SER-ACNC: 3.74 MIU/ML (ref 0.36–3.74)
WBC # BLD AUTO: 6.2 X10(3) UL (ref 4–11)

## 2022-07-05 PROCEDURE — 3074F SYST BP LT 130 MM HG: CPT | Performed by: FAMILY MEDICINE

## 2022-07-05 PROCEDURE — 99213 OFFICE O/P EST LOW 20 MIN: CPT | Performed by: FAMILY MEDICINE

## 2022-07-05 PROCEDURE — 85025 COMPLETE CBC W/AUTO DIFF WBC: CPT

## 2022-07-05 PROCEDURE — 3008F BODY MASS INDEX DOCD: CPT | Performed by: FAMILY MEDICINE

## 2022-07-05 PROCEDURE — 36415 COLL VENOUS BLD VENIPUNCTURE: CPT

## 2022-07-05 PROCEDURE — 84443 ASSAY THYROID STIM HORMONE: CPT

## 2022-07-05 PROCEDURE — 84439 ASSAY OF FREE THYROXINE: CPT

## 2022-07-05 PROCEDURE — 80048 BASIC METABOLIC PNL TOTAL CA: CPT

## 2022-07-05 PROCEDURE — 3079F DIAST BP 80-89 MM HG: CPT | Performed by: FAMILY MEDICINE

## 2022-07-06 DIAGNOSIS — E03.9 ACQUIRED HYPOTHYROIDISM: Primary | ICD-10-CM

## 2022-07-12 ENCOUNTER — ORDER TRANSCRIPTION (OUTPATIENT)
Dept: PHYSICAL THERAPY | Facility: HOSPITAL | Age: 42
End: 2022-07-12

## 2022-07-12 ENCOUNTER — APPOINTMENT (OUTPATIENT)
Dept: PHYSICAL THERAPY | Age: 42
End: 2022-07-12
Attending: ORTHOPAEDIC SURGERY
Payer: COMMERCIAL

## 2022-07-12 DIAGNOSIS — M67.814 BICEPS TENDONOSIS OF LEFT SHOULDER: ICD-10-CM

## 2022-07-12 DIAGNOSIS — Z98.890 S/P LEFT ROTATOR CUFF REPAIR: Primary | ICD-10-CM

## 2022-07-13 ENCOUNTER — MED REC SCAN ONLY (OUTPATIENT)
Dept: FAMILY MEDICINE CLINIC | Facility: CLINIC | Age: 42
End: 2022-07-13

## 2022-07-13 ENCOUNTER — TELEPHONE (OUTPATIENT)
Dept: FAMILY MEDICINE CLINIC | Facility: CLINIC | Age: 42
End: 2022-07-13

## 2022-07-13 NOTE — TELEPHONE ENCOUNTER
Cyndi Enriquez with [facility] is calling for pre op H & P and any pre op testing that was done sent to [phone]

## 2022-07-20 ENCOUNTER — ORDER TRANSCRIPTION (OUTPATIENT)
Dept: PHYSICAL THERAPY | Facility: HOSPITAL | Age: 42
End: 2022-07-20

## 2022-07-20 DIAGNOSIS — M75.112 INCOMPLETE TEAR OF LEFT ROTATOR CUFF, UNSPECIFIED WHETHER TRAUMATIC: ICD-10-CM

## 2022-07-20 DIAGNOSIS — M75.22 BICIPITAL TENDINITIS OF LEFT SHOULDER: Primary | ICD-10-CM

## 2022-08-03 ENCOUNTER — TELEPHONE (OUTPATIENT)
Dept: PHYSICAL THERAPY | Facility: HOSPITAL | Age: 42
End: 2022-08-03

## 2022-08-04 ENCOUNTER — OFFICE VISIT (OUTPATIENT)
Dept: PHYSICAL THERAPY | Age: 42
End: 2022-08-04
Attending: ORTHOPAEDIC SURGERY
Payer: COMMERCIAL

## 2022-08-04 DIAGNOSIS — Z98.890 S/P LEFT ROTATOR CUFF REPAIR: ICD-10-CM

## 2022-08-04 DIAGNOSIS — M67.814 BICEPS TENDONOSIS OF LEFT SHOULDER: ICD-10-CM

## 2022-08-04 PROCEDURE — 97110 THERAPEUTIC EXERCISES: CPT

## 2022-08-04 PROCEDURE — 97161 PT EVAL LOW COMPLEX 20 MIN: CPT

## 2022-08-05 ENCOUNTER — TELEPHONE (OUTPATIENT)
Dept: PHYSICAL THERAPY | Age: 42
End: 2022-08-05

## 2022-08-05 NOTE — TELEPHONE ENCOUNTER
Spoke with Dr. Salazar Greek nurse, who is going to fax over the post-op rehab protocol for Cody's RCT repair.

## 2022-08-09 ENCOUNTER — OFFICE VISIT (OUTPATIENT)
Dept: PHYSICAL THERAPY | Age: 42
End: 2022-08-09
Attending: ORTHOPAEDIC SURGERY
Payer: COMMERCIAL

## 2022-08-09 DIAGNOSIS — M67.814 BICEPS TENDONOSIS OF LEFT SHOULDER: ICD-10-CM

## 2022-08-09 DIAGNOSIS — Z98.890 S/P LEFT ROTATOR CUFF REPAIR: ICD-10-CM

## 2022-08-09 PROCEDURE — 97140 MANUAL THERAPY 1/> REGIONS: CPT

## 2022-08-09 PROCEDURE — 97110 THERAPEUTIC EXERCISES: CPT

## 2022-08-11 ENCOUNTER — OFFICE VISIT (OUTPATIENT)
Dept: PHYSICAL THERAPY | Age: 42
End: 2022-08-11
Attending: ORTHOPAEDIC SURGERY
Payer: COMMERCIAL

## 2022-08-11 DIAGNOSIS — Z98.890 S/P LEFT ROTATOR CUFF REPAIR: ICD-10-CM

## 2022-08-11 DIAGNOSIS — M67.814 BICEPS TENDONOSIS OF LEFT SHOULDER: ICD-10-CM

## 2022-08-11 PROCEDURE — 97140 MANUAL THERAPY 1/> REGIONS: CPT

## 2022-08-11 PROCEDURE — 97110 THERAPEUTIC EXERCISES: CPT

## 2022-08-15 ENCOUNTER — OFFICE VISIT (OUTPATIENT)
Dept: PHYSICAL THERAPY | Age: 42
End: 2022-08-15
Attending: ORTHOPAEDIC SURGERY
Payer: COMMERCIAL

## 2022-08-15 DIAGNOSIS — M67.814 BICEPS TENDONOSIS OF LEFT SHOULDER: ICD-10-CM

## 2022-08-15 DIAGNOSIS — Z98.890 S/P LEFT ROTATOR CUFF REPAIR: ICD-10-CM

## 2022-08-15 PROCEDURE — 97110 THERAPEUTIC EXERCISES: CPT

## 2022-08-15 PROCEDURE — 97140 MANUAL THERAPY 1/> REGIONS: CPT

## 2022-08-18 ENCOUNTER — OFFICE VISIT (OUTPATIENT)
Dept: PHYSICAL THERAPY | Age: 42
End: 2022-08-18
Attending: ORTHOPAEDIC SURGERY
Payer: COMMERCIAL

## 2022-08-18 DIAGNOSIS — Z98.890 S/P LEFT ROTATOR CUFF REPAIR: ICD-10-CM

## 2022-08-18 DIAGNOSIS — M67.814 BICEPS TENDONOSIS OF LEFT SHOULDER: ICD-10-CM

## 2022-08-18 PROCEDURE — 97110 THERAPEUTIC EXERCISES: CPT

## 2022-08-18 PROCEDURE — 97140 MANUAL THERAPY 1/> REGIONS: CPT

## 2022-08-22 ENCOUNTER — OFFICE VISIT (OUTPATIENT)
Dept: PHYSICAL THERAPY | Age: 42
End: 2022-08-22
Attending: ORTHOPAEDIC SURGERY
Payer: COMMERCIAL

## 2022-08-22 DIAGNOSIS — Z98.890 S/P LEFT ROTATOR CUFF REPAIR: ICD-10-CM

## 2022-08-22 DIAGNOSIS — M67.814 BICEPS TENDONOSIS OF LEFT SHOULDER: ICD-10-CM

## 2022-08-22 PROCEDURE — 97110 THERAPEUTIC EXERCISES: CPT

## 2022-08-22 PROCEDURE — 97140 MANUAL THERAPY 1/> REGIONS: CPT

## 2022-08-26 ENCOUNTER — OFFICE VISIT (OUTPATIENT)
Dept: PHYSICAL THERAPY | Age: 42
End: 2022-08-26
Attending: ORTHOPAEDIC SURGERY
Payer: COMMERCIAL

## 2022-08-26 DIAGNOSIS — Z98.890 S/P LEFT ROTATOR CUFF REPAIR: ICD-10-CM

## 2022-08-26 DIAGNOSIS — M67.814 BICEPS TENDONOSIS OF LEFT SHOULDER: ICD-10-CM

## 2022-08-26 PROCEDURE — 97110 THERAPEUTIC EXERCISES: CPT

## 2022-08-26 PROCEDURE — 97140 MANUAL THERAPY 1/> REGIONS: CPT

## 2022-08-29 ENCOUNTER — OFFICE VISIT (OUTPATIENT)
Dept: PHYSICAL THERAPY | Age: 42
End: 2022-08-29
Attending: ORTHOPAEDIC SURGERY
Payer: COMMERCIAL

## 2022-08-29 DIAGNOSIS — M67.814 BICEPS TENDONOSIS OF LEFT SHOULDER: ICD-10-CM

## 2022-08-29 DIAGNOSIS — Z98.890 S/P LEFT ROTATOR CUFF REPAIR: ICD-10-CM

## 2022-08-29 PROCEDURE — 97110 THERAPEUTIC EXERCISES: CPT

## 2022-08-29 PROCEDURE — 97140 MANUAL THERAPY 1/> REGIONS: CPT

## 2022-08-31 ENCOUNTER — MED REC SCAN ONLY (OUTPATIENT)
Dept: FAMILY MEDICINE CLINIC | Facility: CLINIC | Age: 42
End: 2022-08-31

## 2022-09-01 ENCOUNTER — OFFICE VISIT (OUTPATIENT)
Dept: PHYSICAL THERAPY | Age: 42
End: 2022-09-01
Attending: ORTHOPAEDIC SURGERY
Payer: COMMERCIAL

## 2022-09-01 ENCOUNTER — ORDER TRANSCRIPTION (OUTPATIENT)
Dept: PHYSICAL THERAPY | Facility: HOSPITAL | Age: 42
End: 2022-09-01

## 2022-09-01 DIAGNOSIS — Z98.890 S/P LEFT ROTATOR CUFF REPAIR: ICD-10-CM

## 2022-09-01 DIAGNOSIS — M67.814 BICEPS TENDONOSIS OF LEFT SHOULDER: ICD-10-CM

## 2022-09-01 DIAGNOSIS — Z98.890 STATUS POST ARTHROSCOPY OF LEFT SHOULDER: Primary | ICD-10-CM

## 2022-09-01 DIAGNOSIS — M75.102 NONTRAUMATIC TEAR OF LEFT ROTATOR CUFF, UNSPECIFIED TEAR EXTENT: ICD-10-CM

## 2022-09-01 PROCEDURE — 97110 THERAPEUTIC EXERCISES: CPT

## 2022-09-01 PROCEDURE — 97140 MANUAL THERAPY 1/> REGIONS: CPT

## 2022-09-06 ENCOUNTER — OFFICE VISIT (OUTPATIENT)
Dept: PHYSICAL THERAPY | Age: 42
End: 2022-09-06
Attending: ORTHOPAEDIC SURGERY
Payer: COMMERCIAL

## 2022-09-06 DIAGNOSIS — M67.814 BICEPS TENDONOSIS OF LEFT SHOULDER: ICD-10-CM

## 2022-09-06 DIAGNOSIS — Z98.890 S/P LEFT ROTATOR CUFF REPAIR: ICD-10-CM

## 2022-09-06 PROCEDURE — 97110 THERAPEUTIC EXERCISES: CPT

## 2022-09-06 PROCEDURE — 97140 MANUAL THERAPY 1/> REGIONS: CPT

## 2022-09-08 ENCOUNTER — OFFICE VISIT (OUTPATIENT)
Dept: PHYSICAL THERAPY | Age: 42
End: 2022-09-08
Attending: ORTHOPAEDIC SURGERY
Payer: COMMERCIAL

## 2022-09-08 DIAGNOSIS — Z98.890 S/P LEFT ROTATOR CUFF REPAIR: ICD-10-CM

## 2022-09-08 DIAGNOSIS — M67.814 BICEPS TENDONOSIS OF LEFT SHOULDER: ICD-10-CM

## 2022-09-08 PROCEDURE — 97140 MANUAL THERAPY 1/> REGIONS: CPT

## 2022-09-08 PROCEDURE — 97110 THERAPEUTIC EXERCISES: CPT

## 2022-09-12 ENCOUNTER — APPOINTMENT (OUTPATIENT)
Dept: PHYSICAL THERAPY | Age: 42
End: 2022-09-12
Attending: ORTHOPAEDIC SURGERY
Payer: COMMERCIAL

## 2022-09-14 ENCOUNTER — OFFICE VISIT (OUTPATIENT)
Dept: PHYSICAL THERAPY | Age: 42
End: 2022-09-14
Attending: ORTHOPAEDIC SURGERY
Payer: COMMERCIAL

## 2022-09-14 PROCEDURE — 97140 MANUAL THERAPY 1/> REGIONS: CPT

## 2022-09-14 PROCEDURE — 97110 THERAPEUTIC EXERCISES: CPT

## 2022-09-19 ENCOUNTER — OFFICE VISIT (OUTPATIENT)
Dept: PHYSICAL THERAPY | Age: 42
End: 2022-09-19
Attending: ORTHOPAEDIC SURGERY
Payer: COMMERCIAL

## 2022-09-19 PROCEDURE — 97140 MANUAL THERAPY 1/> REGIONS: CPT

## 2022-09-19 PROCEDURE — 97110 THERAPEUTIC EXERCISES: CPT

## 2022-09-22 ENCOUNTER — OFFICE VISIT (OUTPATIENT)
Dept: PHYSICAL THERAPY | Age: 42
End: 2022-09-22
Attending: ORTHOPAEDIC SURGERY
Payer: COMMERCIAL

## 2022-09-22 PROCEDURE — 97140 MANUAL THERAPY 1/> REGIONS: CPT

## 2022-09-22 PROCEDURE — 97110 THERAPEUTIC EXERCISES: CPT

## 2022-09-29 ENCOUNTER — OFFICE VISIT (OUTPATIENT)
Dept: PHYSICAL THERAPY | Age: 42
End: 2022-09-29
Attending: ORTHOPAEDIC SURGERY

## 2022-09-29 PROCEDURE — 97110 THERAPEUTIC EXERCISES: CPT

## 2022-09-29 PROCEDURE — 97140 MANUAL THERAPY 1/> REGIONS: CPT

## 2022-09-30 ENCOUNTER — TELEPHONE (OUTPATIENT)
Dept: PHYSICAL THERAPY | Facility: HOSPITAL | Age: 42
End: 2022-09-30

## 2022-10-04 ENCOUNTER — APPOINTMENT (OUTPATIENT)
Dept: PHYSICAL THERAPY | Age: 42
End: 2022-10-04
Payer: COMMERCIAL

## 2022-10-07 ENCOUNTER — OFFICE VISIT (OUTPATIENT)
Dept: PHYSICAL THERAPY | Age: 42
End: 2022-10-07
Attending: ORTHOPAEDIC SURGERY
Payer: COMMERCIAL

## 2022-10-07 PROCEDURE — 97110 THERAPEUTIC EXERCISES: CPT

## 2022-10-07 PROCEDURE — 97140 MANUAL THERAPY 1/> REGIONS: CPT

## 2022-10-10 ENCOUNTER — APPOINTMENT (OUTPATIENT)
Dept: PHYSICAL THERAPY | Age: 42
End: 2022-10-10
Attending: ORTHOPAEDIC SURGERY
Payer: COMMERCIAL

## 2022-10-12 ENCOUNTER — PATIENT MESSAGE (OUTPATIENT)
Dept: FAMILY MEDICINE CLINIC | Facility: CLINIC | Age: 42
End: 2022-10-12

## 2022-10-12 NOTE — TELEPHONE ENCOUNTER
If it is rosacea, then using MetroGel twice daily to the affected areas should help.   Also give patient contact information for Dr. Yi Centeno in [facility]

## 2022-10-13 ENCOUNTER — OFFICE VISIT (OUTPATIENT)
Dept: PHYSICAL THERAPY | Age: 42
End: 2022-10-13
Attending: ORTHOPAEDIC SURGERY
Payer: COMMERCIAL

## 2022-10-13 PROCEDURE — 97110 THERAPEUTIC EXERCISES: CPT

## 2022-10-13 PROCEDURE — 97140 MANUAL THERAPY 1/> REGIONS: CPT

## 2022-10-13 RX ORDER — METRONIDAZOLE 10 MG/G
GEL TOPICAL
Qty: 60 G | Refills: 0 | Status: SHIPPED | OUTPATIENT
Start: 2022-10-13

## 2022-10-17 ENCOUNTER — OFFICE VISIT (OUTPATIENT)
Dept: PHYSICAL THERAPY | Age: 42
End: 2022-10-17
Attending: FAMILY MEDICINE
Payer: COMMERCIAL

## 2022-10-17 PROCEDURE — 97140 MANUAL THERAPY 1/> REGIONS: CPT

## 2022-10-17 PROCEDURE — 97110 THERAPEUTIC EXERCISES: CPT

## 2022-10-20 ENCOUNTER — OFFICE VISIT (OUTPATIENT)
Dept: PHYSICAL THERAPY | Age: 42
End: 2022-10-20
Attending: ORTHOPAEDIC SURGERY
Payer: COMMERCIAL

## 2022-10-20 PROCEDURE — 97110 THERAPEUTIC EXERCISES: CPT

## 2022-10-20 PROCEDURE — 97140 MANUAL THERAPY 1/> REGIONS: CPT

## 2022-10-24 ENCOUNTER — APPOINTMENT (OUTPATIENT)
Dept: PHYSICAL THERAPY | Age: 42
End: 2022-10-24
Payer: COMMERCIAL

## 2022-10-27 ENCOUNTER — OFFICE VISIT (OUTPATIENT)
Dept: PHYSICAL THERAPY | Age: 42
End: 2022-10-27
Attending: ORTHOPAEDIC SURGERY
Payer: COMMERCIAL

## 2022-10-27 PROCEDURE — 97140 MANUAL THERAPY 1/> REGIONS: CPT

## 2022-10-27 PROCEDURE — 97110 THERAPEUTIC EXERCISES: CPT

## 2022-10-31 ENCOUNTER — OFFICE VISIT (OUTPATIENT)
Dept: PHYSICAL THERAPY | Age: 42
End: 2022-10-31
Attending: ORTHOPAEDIC SURGERY
Payer: COMMERCIAL

## 2022-10-31 PROCEDURE — 97140 MANUAL THERAPY 1/> REGIONS: CPT

## 2022-10-31 PROCEDURE — 97110 THERAPEUTIC EXERCISES: CPT

## 2022-11-03 ENCOUNTER — OFFICE VISIT (OUTPATIENT)
Dept: PHYSICAL THERAPY | Age: 42
End: 2022-11-03
Attending: ORTHOPAEDIC SURGERY
Payer: COMMERCIAL

## 2022-11-03 PROCEDURE — 97140 MANUAL THERAPY 1/> REGIONS: CPT

## 2022-11-03 PROCEDURE — 97110 THERAPEUTIC EXERCISES: CPT

## 2022-11-07 ENCOUNTER — APPOINTMENT (OUTPATIENT)
Dept: PHYSICAL THERAPY | Age: 42
End: 2022-11-07
Payer: COMMERCIAL

## 2022-11-08 RX ORDER — LEVOTHYROXINE SODIUM 0.05 MG/1
TABLET ORAL
Qty: 90 TABLET | Refills: 0 | Status: SHIPPED | OUTPATIENT
Start: 2022-11-08

## 2022-11-10 ENCOUNTER — OFFICE VISIT (OUTPATIENT)
Dept: PHYSICAL THERAPY | Age: 42
End: 2022-11-10
Attending: ORTHOPAEDIC SURGERY
Payer: COMMERCIAL

## 2022-11-10 ENCOUNTER — MED REC SCAN ONLY (OUTPATIENT)
Dept: FAMILY MEDICINE CLINIC | Facility: CLINIC | Age: 42
End: 2022-11-10

## 2022-11-10 PROCEDURE — 97110 THERAPEUTIC EXERCISES: CPT

## 2022-11-10 PROCEDURE — 97140 MANUAL THERAPY 1/> REGIONS: CPT

## 2022-11-14 ENCOUNTER — APPOINTMENT (OUTPATIENT)
Dept: PHYSICAL THERAPY | Age: 42
End: 2022-11-14
Payer: COMMERCIAL

## 2022-11-17 ENCOUNTER — OFFICE VISIT (OUTPATIENT)
Dept: PHYSICAL THERAPY | Age: 42
End: 2022-11-17
Attending: PHYSICIAN ASSISTANT
Payer: COMMERCIAL

## 2022-11-17 PROCEDURE — 97140 MANUAL THERAPY 1/> REGIONS: CPT

## 2022-11-17 PROCEDURE — 97110 THERAPEUTIC EXERCISES: CPT

## 2022-11-25 ENCOUNTER — TELEPHONE (OUTPATIENT)
Dept: PHYSICAL THERAPY | Facility: HOSPITAL | Age: 42
End: 2022-11-25

## 2022-11-29 ENCOUNTER — TELEPHONE (OUTPATIENT)
Dept: PHYSICAL THERAPY | Facility: HOSPITAL | Age: 42
End: 2022-11-29

## 2022-12-02 ENCOUNTER — APPOINTMENT (OUTPATIENT)
Dept: PHYSICAL THERAPY | Age: 42
End: 2022-12-02
Attending: PHYSICIAN ASSISTANT
Payer: COMMERCIAL

## 2022-12-05 ENCOUNTER — OFFICE VISIT (OUTPATIENT)
Dept: PHYSICAL THERAPY | Age: 42
End: 2022-12-05
Attending: PHYSICIAN ASSISTANT
Payer: COMMERCIAL

## 2022-12-05 PROCEDURE — 97110 THERAPEUTIC EXERCISES: CPT

## 2022-12-05 PROCEDURE — 97140 MANUAL THERAPY 1/> REGIONS: CPT

## 2022-12-27 ENCOUNTER — MED REC SCAN ONLY (OUTPATIENT)
Dept: FAMILY MEDICINE CLINIC | Facility: CLINIC | Age: 42
End: 2022-12-27

## 2022-12-29 ENCOUNTER — APPOINTMENT (OUTPATIENT)
Dept: PHYSICAL THERAPY | Age: 42
End: 2022-12-29
Attending: PHYSICIAN ASSISTANT
Payer: COMMERCIAL

## 2023-01-14 ENCOUNTER — OFFICE VISIT (OUTPATIENT)
Dept: DERMATOLOGY | Age: 43
End: 2023-01-14

## 2023-01-14 DIAGNOSIS — L71.9 ROSACEA: Primary | ICD-10-CM

## 2023-01-14 PROCEDURE — 99203 OFFICE O/P NEW LOW 30 MIN: CPT | Performed by: DERMATOLOGY

## 2023-01-14 RX ORDER — DOXYCYCLINE 40 MG/1
CAPSULE ORAL
COMMUNITY
Start: 2022-12-31

## 2023-01-14 RX ORDER — LEVOTHYROXINE SODIUM 0.05 MG/1
TABLET ORAL
COMMUNITY
Start: 2022-11-08

## 2023-01-14 RX ORDER — CETIRIZINE HYDROCHLORIDE 10 MG/1
10 TABLET ORAL DAILY
COMMUNITY

## 2023-02-02 RX ORDER — LEVOTHYROXINE SODIUM 0.05 MG/1
TABLET ORAL
Qty: 90 TABLET | Refills: 0 | Status: SHIPPED | OUTPATIENT
Start: 2023-02-02

## 2023-02-02 NOTE — TELEPHONE ENCOUNTER
LOV 7-5-22    LAST LAB 7-5-22 TSH 3.74    LAST RX 11-8-22 #90    Next OV  No future appointments.     PROTOCOL  Thyroid Supplements Protocol Passed 02/02/2023 02:51 PM   Protocol Details  TSH test in past 12 months    TSH value between 0.350 and 5.500 IU/ml    Appointment in past 12 or next 3 months

## 2023-03-04 ENCOUNTER — OFFICE VISIT (OUTPATIENT)
Dept: FAMILY MEDICINE CLINIC | Facility: CLINIC | Age: 43
End: 2023-03-04
Payer: COMMERCIAL

## 2023-03-04 VITALS
RESPIRATION RATE: 18 BRPM | TEMPERATURE: 98 F | BODY MASS INDEX: 30.38 KG/M2 | DIASTOLIC BLOOD PRESSURE: 80 MMHG | HEART RATE: 61 BPM | OXYGEN SATURATION: 100 % | WEIGHT: 217 LBS | SYSTOLIC BLOOD PRESSURE: 115 MMHG | HEIGHT: 71 IN

## 2023-03-04 DIAGNOSIS — R53.83 FATIGUE, UNSPECIFIED TYPE: ICD-10-CM

## 2023-03-04 DIAGNOSIS — L71.9 ROSACEA: ICD-10-CM

## 2023-03-04 DIAGNOSIS — E78.00 HYPERCHOLESTEREMIA: ICD-10-CM

## 2023-03-04 DIAGNOSIS — Z23 NEED FOR VACCINATION: ICD-10-CM

## 2023-03-04 DIAGNOSIS — E03.9 ACQUIRED HYPOTHYROIDISM: ICD-10-CM

## 2023-03-04 DIAGNOSIS — Z13.21 ENCOUNTER FOR VITAMIN DEFICIENCY SCREENING: ICD-10-CM

## 2023-03-04 DIAGNOSIS — Z86.19 HISTORY OF GENITAL WARTS: ICD-10-CM

## 2023-03-04 DIAGNOSIS — J45.990 EXERCISE-INDUCED ASTHMA: ICD-10-CM

## 2023-03-04 DIAGNOSIS — Z00.00 PREVENTATIVE HEALTH CARE: Primary | ICD-10-CM

## 2023-03-04 PROCEDURE — 3008F BODY MASS INDEX DOCD: CPT | Performed by: FAMILY MEDICINE

## 2023-03-04 PROCEDURE — 99396 PREV VISIT EST AGE 40-64: CPT | Performed by: FAMILY MEDICINE

## 2023-03-04 PROCEDURE — 3079F DIAST BP 80-89 MM HG: CPT | Performed by: FAMILY MEDICINE

## 2023-03-04 PROCEDURE — 90471 IMMUNIZATION ADMIN: CPT | Performed by: FAMILY MEDICINE

## 2023-03-04 PROCEDURE — 90715 TDAP VACCINE 7 YRS/> IM: CPT | Performed by: FAMILY MEDICINE

## 2023-03-04 PROCEDURE — 3074F SYST BP LT 130 MM HG: CPT | Performed by: FAMILY MEDICINE

## 2023-03-04 RX ORDER — DOXYCYCLINE 40 MG/1
CAPSULE ORAL
COMMUNITY
Start: 2023-03-01

## 2023-03-04 NOTE — PATIENT INSTRUCTIONS
1. Preventative health care  Appropriate preventive health screening exams as well as immunizations. I discussed the importance of lower carbohydrate food choices, avoidance of starches, eating behavior modification and increase daily aerobic activity. Discussed benefits of flexibility training and core strengthening such as yoga. Discussed high intensity interval training. Discussed intermittent fasting continue daily thyroid  - BASIC METABOLIC PANEL (8)    2. Acquired hypothyroidism  Continue thyroid replacement, check labs given unusual fatigue  - ASSAY, THYROID STIM HORMONE  - FREE T4 (FREE THYROXINE)    3. Exercise-induced asthma  Reviewed AAP, ACT score 25,agree with AAP  no changes made. 4. History of genital warts  Monitor clinically    5. Hypercholesteremia  Reviewed goals for lipids. Discussed importance of weight reduction and lowering cholesterol, defer medication, recheck 1 year    6. Rosacea  Continue dermatology treatment    7. Need for vaccination  Tdap booster given  - TETANUS, DIPHTHERIA TOXOIDS AND ACELLULAR PERTUSIS VACCINE (TDAP), >7 YEARS, IM USE    8. Fatigue, unspecified type  Discussed common causes of fatigue including metabolic, sleep-related as well as stress  - ASSAY, THYROID STIM HORMONE  - FREE T4 (FREE THYROXINE)  - TESTOSTERONE,FREE AND TOTAL; Future    9.  Encounter for vitamin deficiency screening  Check vitamin D  - VITAMIN D, 1,25 DIHYDROXY; Future

## 2023-03-06 ENCOUNTER — TELEPHONE (OUTPATIENT)
Dept: FAMILY MEDICINE CLINIC | Facility: CLINIC | Age: 43
End: 2023-03-06

## 2023-03-06 NOTE — TELEPHONE ENCOUNTER
PT NEEDS TO HAVE LAB ORDERS FAXED TO [facility] IN [place] [facility] OR CHANGE ORDERS IN THE SYSTEM. PT ALSO HAS QUESTIONS ON LABS ORDERED.

## 2023-03-13 ENCOUNTER — TELEPHONE (OUTPATIENT)
Dept: FAMILY MEDICINE CLINIC | Facility: CLINIC | Age: 43
End: 2023-03-13

## 2023-03-18 ENCOUNTER — TELEPHONE (OUTPATIENT)
Dept: FAMILY MEDICINE CLINIC | Facility: CLINIC | Age: 43
End: 2023-03-18

## 2023-03-18 DIAGNOSIS — E55.9 VITAMIN D DEFICIENCY: Primary | ICD-10-CM

## 2023-03-18 DIAGNOSIS — E78.00 HYPERCHOLESTEREMIA: ICD-10-CM

## 2023-03-18 NOTE — TELEPHONE ENCOUNTER
Pt went to [facility] [employer] today. For the vitamin d, they gave him a form that stated it wasn't covered. They states we needed to send something with a code 4 on it so the lab would be covered. He brought a form into the office. He did have labs done this morning & needed this done asap. If we need to call [facility], [phone]    He said if dr wanted to add a lipid panel to his appt today at [facility] then we can send it over. Advised dr is out of office. Please call patient when this is completed.

## 2023-03-21 LAB
BUN: 11 MG/DL (ref 7–25)
CALCIUM: 10 MG/DL (ref 8.6–10.3)
CARBON DIOXIDE: 28 MMOL/L (ref 20–32)
CHLORIDE: 104 MMOL/L (ref 98–110)
CHOL/HDLC RATIO: 5.2 (CALC)
CHOLESTEROL, TOTAL: 222 MG/DL
CREATININE: 0.87 MG/DL (ref 0.6–1.29)
EGFR: 112 ML/MIN/1.73M2
FREE TESTOSTERONE: 61.1 PG/ML (ref 35–155)
GLUCOSE: 83 MG/DL (ref 65–99)
HDL CHOLESTEROL: 43 MG/DL
LDL-CHOLESTEROL: 154 MG/DL (CALC)
NON-HDL CHOLESTEROL: 179 MG/DL (CALC)
POTASSIUM: 5.2 MMOL/L (ref 3.5–5.3)
SODIUM: 140 MMOL/L (ref 135–146)
T4, FREE: 1.1 NG/DL (ref 0.8–1.8)
TESTOSTERONE, TOTAL,$/MS/MS: 294 NG/DL (ref 250–1100)
TRIGLYCERIDES: 125 MG/DL
TSH: 7.43 MIU/L (ref 0.4–4.5)
VITAMIN D, 25-OH, TOTAL: 31 NG/ML (ref 30–100)

## 2023-03-22 DIAGNOSIS — E55.9 VITAMIN D DEFICIENCY: Primary | ICD-10-CM

## 2023-03-22 DIAGNOSIS — Z13.21 ENCOUNTER FOR VITAMIN DEFICIENCY SCREENING: ICD-10-CM

## 2023-03-22 DIAGNOSIS — E78.00 HYPERCHOLESTEREMIA: ICD-10-CM

## 2023-03-22 DIAGNOSIS — Z51.81 MEDICATION MONITORING ENCOUNTER: ICD-10-CM

## 2023-03-22 DIAGNOSIS — R53.83 FATIGUE, UNSPECIFIED TYPE: ICD-10-CM

## 2023-03-22 DIAGNOSIS — E03.9 ACQUIRED HYPOTHYROIDISM: ICD-10-CM

## 2023-03-22 RX ORDER — LEVOTHYROXINE SODIUM 0.07 MG/1
75 TABLET ORAL
Qty: 90 TABLET | Refills: 0 | Status: SHIPPED | OUTPATIENT
Start: 2023-03-22 | End: 2023-03-22

## 2023-03-22 RX ORDER — SIMVASTATIN 20 MG
20 TABLET ORAL NIGHTLY
Qty: 90 TABLET | Refills: 0 | Status: SHIPPED | OUTPATIENT
Start: 2023-03-22 | End: 2023-03-22

## 2023-05-03 ENCOUNTER — PATIENT MESSAGE (OUTPATIENT)
Dept: FAMILY MEDICINE CLINIC | Facility: CLINIC | Age: 43
End: 2023-05-03

## 2023-05-03 DIAGNOSIS — L71.9 ROSACEA: Primary | ICD-10-CM

## 2023-05-05 RX ORDER — DOXYCYCLINE HYCLATE 100 MG/1
100 CAPSULE ORAL 2 TIMES DAILY
Qty: 14 CAPSULE | Refills: 0 | Status: SHIPPED | OUTPATIENT
Start: 2023-05-05 | End: 2023-05-12

## 2023-06-10 ENCOUNTER — TELEPHONE (OUTPATIENT)
Dept: FAMILY MEDICINE CLINIC | Facility: CLINIC | Age: 43
End: 2023-06-10

## 2023-06-10 NOTE — TELEPHONE ENCOUNTER
Called Cody to see what [facility] to send orders to, faxed orders to [address] [place] [address] in [place]. Let Lisa Rod know that we would need to refill his medication off his labs results and to try to get his labs done this week. He verbalized understanding.

## 2023-06-10 NOTE — TELEPHONE ENCOUNTER
HE WANTS HIS LAB ORDER SENT TO [facility].  ALSO HE NEEDS A REFILL ON THE LEVOTHYROXINE   [facility] IN [facility]

## 2023-06-18 LAB
ALBUMIN/GLOBULIN RATIO: 1.7 (CALC) (ref 1–2.5)
ALBUMIN: 4.4 G/DL (ref 3.6–5.1)
ALKALINE PHOSPHATASE: 70 U/L (ref 36–130)
ALT: 29 U/L (ref 9–46)
AST: 24 U/L (ref 10–40)
BILIRUBIN, TOTAL: 0.5 MG/DL (ref 0.2–1.2)
BUN: 14 MG/DL (ref 7–25)
CALCIUM: 9.6 MG/DL (ref 8.6–10.3)
CARBON DIOXIDE: 30 MMOL/L (ref 20–32)
CHLORIDE: 103 MMOL/L (ref 98–110)
CHOL/HDLC RATIO: 5.1 (CALC)
CHOLESTEROL, TOTAL: 229 MG/DL
CREATININE: 0.89 MG/DL (ref 0.6–1.29)
EGFR: 111 ML/MIN/1.73M2
GLOBULIN: 2.6 G/DL (CALC) (ref 1.9–3.7)
GLUCOSE: 80 MG/DL (ref 65–99)
HDL CHOLESTEROL: 45 MG/DL
LDL-CHOLESTEROL: 163 MG/DL (CALC)
NON-HDL CHOLESTEROL: 184 MG/DL (CALC)
POTASSIUM: 4.4 MMOL/L (ref 3.5–5.3)
PROTEIN, TOTAL: 7 G/DL (ref 6.1–8.1)
SODIUM: 138 MMOL/L (ref 135–146)
T4, FREE: 1.3 NG/DL (ref 0.8–1.8)
TRIGLYCERIDES: 99 MG/DL
TSH: 4.49 MIU/L (ref 0.4–4.5)
VITAMIN D, 25-OH, TOTAL: 31 NG/ML (ref 30–100)

## 2023-06-20 ENCOUNTER — TELEPHONE (OUTPATIENT)
Dept: FAMILY MEDICINE CLINIC | Facility: CLINIC | Age: 43
End: 2023-06-20

## 2023-06-20 RX ORDER — LEVOTHYROXINE SODIUM 0.07 MG/1
75 TABLET ORAL
Qty: 90 TABLET | Refills: 3 | Status: SHIPPED | OUTPATIENT
Start: 2023-06-20

## 2023-06-23 ENCOUNTER — OFFICE VISIT (OUTPATIENT)
Dept: FAMILY MEDICINE CLINIC | Facility: CLINIC | Age: 43
End: 2023-06-23
Payer: COMMERCIAL

## 2023-06-23 VITALS
WEIGHT: 207 LBS | OXYGEN SATURATION: 97 % | DIASTOLIC BLOOD PRESSURE: 84 MMHG | HEIGHT: 71 IN | RESPIRATION RATE: 18 BRPM | TEMPERATURE: 99 F | BODY MASS INDEX: 28.98 KG/M2 | HEART RATE: 68 BPM | SYSTOLIC BLOOD PRESSURE: 120 MMHG

## 2023-06-23 DIAGNOSIS — E55.9 VITAMIN D DEFICIENCY: ICD-10-CM

## 2023-06-23 DIAGNOSIS — E03.9 ACQUIRED HYPOTHYROIDISM: Primary | ICD-10-CM

## 2023-06-23 DIAGNOSIS — L73.9 FOLLICULITIS: ICD-10-CM

## 2023-06-23 DIAGNOSIS — L71.8 OCULAR ROSACEA: ICD-10-CM

## 2023-06-23 PROBLEM — U07.1 COVID-19 VIRUS INFECTION: Status: RESOLVED | Noted: 2022-06-03 | Resolved: 2023-06-23

## 2023-06-23 PROCEDURE — 99214 OFFICE O/P EST MOD 30 MIN: CPT | Performed by: FAMILY MEDICINE

## 2023-06-23 PROCEDURE — 3008F BODY MASS INDEX DOCD: CPT | Performed by: FAMILY MEDICINE

## 2023-06-23 PROCEDURE — 3074F SYST BP LT 130 MM HG: CPT | Performed by: FAMILY MEDICINE

## 2023-06-23 PROCEDURE — 3079F DIAST BP 80-89 MM HG: CPT | Performed by: FAMILY MEDICINE

## 2023-06-23 RX ORDER — CEPHALEXIN 500 MG/1
CAPSULE ORAL
Qty: 40 CAPSULE | Refills: 2 | Status: SHIPPED | OUTPATIENT
Start: 2023-06-23

## 2023-06-23 NOTE — PATIENT INSTRUCTIONS
I reviewed the results of most recent labs with the patient. I discussed goals for lipids. I advised him that his 10-year cardiovascular risk is only 1.4%. I would ask him to continue focusing on on weight reduction, healthy food choices and recheck lipids in 6 months. I do not feel that he would benefit from statin therapy at this time. I advised patient I do not feel that his rash is due to vitamin D but rather a folliculitis.   While waiting to get his IPL, would start cephalexin 500 mg 3 times daily x1 week to help address folliculitis then decrease to daily 3 days/week to keep his ocular rosacea under control, would titrate antibiotic dose to lowest dose to obtain desired effect  I have asked patient to continue vitamin D 5000 IU daily  Continue same dose of thyroid meds and recheck 1 year

## 2023-10-23 ENCOUNTER — TELEPHONE (OUTPATIENT)
Dept: FAMILY MEDICINE CLINIC | Facility: CLINIC | Age: 43
End: 2023-10-23

## 2023-10-23 NOTE — TELEPHONE ENCOUNTER
See below. I know we cannot do them here, but do you have any other suggestions? Have him discuss with his allergists office?

## 2023-10-23 NOTE — TELEPHONE ENCOUNTER
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 IF NOT IS THERE ANY OTHER SUGGESTIONS/ OPTIONS ON WHAT HE COULD DO?  CALL PT

## 2023-10-24 NOTE — TELEPHONE ENCOUNTER
Please advise patient that we do not give allergy shots here.   He will need to make arrangements with his allergist

## 2023-11-22 ENCOUNTER — HOSPITAL ENCOUNTER (OUTPATIENT)
Age: 43
Discharge: HOME | End: 2023-11-22
Payer: COMMERCIAL

## 2023-11-22 VITALS
BODY MASS INDEX: 28.28 KG/M2 | HEART RATE: 70 BPM | SYSTOLIC BLOOD PRESSURE: 130 MMHG | RESPIRATION RATE: 16 BRPM | OXYGEN SATURATION: 98 % | TEMPERATURE: 98 F | WEIGHT: 202 LBS | HEIGHT: 71 IN | DIASTOLIC BLOOD PRESSURE: 76 MMHG

## 2023-11-22 DIAGNOSIS — J01.10 ACUTE NON-RECURRENT FRONTAL SINUSITIS: Primary | ICD-10-CM

## 2023-11-22 PROCEDURE — 99203 OFFICE O/P NEW LOW 30 MIN: CPT | Performed by: NURSE PRACTITIONER

## 2023-11-22 RX ORDER — AMOXICILLIN AND CLAVULANATE POTASSIUM 875; 125 MG/1; MG/1
1 TABLET, FILM COATED ORAL 2 TIMES DAILY
Qty: 14 TABLET | Refills: 0 | Status: SHIPPED | OUTPATIENT
Start: 2023-11-22 | End: 2023-11-29

## 2023-11-22 RX ORDER — METHYLPREDNISOLONE 4 MG/1
TABLET ORAL
Qty: 1 EACH | Refills: 0 | Status: SHIPPED | OUTPATIENT
Start: 2023-11-22

## 2024-03-31 ENCOUNTER — PATIENT MESSAGE (OUTPATIENT)
Dept: FAMILY MEDICINE CLINIC | Facility: CLINIC | Age: 44
End: 2024-03-31

## 2024-04-01 RX ORDER — ALBUTEROL SULFATE 90 UG/1
2 AEROSOL, METERED RESPIRATORY (INHALATION) EVERY 4 HOURS PRN
Qty: 8.5 G | Refills: 0 | Status: SHIPPED | OUTPATIENT
Start: 2024-04-01

## 2024-04-01 NOTE — TELEPHONE ENCOUNTER
From: Cody Oliva  To: Dameon Powell  Sent: 3/31/2024 8:08 PM CDT  Subject: Inhaler    Junie VITALE,    I haven’t used my inhaler in over a year and I don’t have one around the house. I can tell I could use one. I’m sure it’s the changing of the seasons.. Can I get a prescription for an inhaler again?    Thanks,  Cody

## 2024-05-18 ENCOUNTER — OFFICE VISIT (OUTPATIENT)
Dept: FAMILY MEDICINE CLINIC | Facility: CLINIC | Age: 44
End: 2024-05-18

## 2024-05-18 VITALS
HEART RATE: 79 BPM | TEMPERATURE: 98 F | RESPIRATION RATE: 16 BRPM | OXYGEN SATURATION: 98 % | BODY MASS INDEX: 28.76 KG/M2 | SYSTOLIC BLOOD PRESSURE: 130 MMHG | HEIGHT: 71.65 IN | WEIGHT: 210 LBS | DIASTOLIC BLOOD PRESSURE: 72 MMHG

## 2024-05-18 DIAGNOSIS — E78.00 HYPERCHOLESTEREMIA: ICD-10-CM

## 2024-05-18 DIAGNOSIS — E03.9 ACQUIRED HYPOTHYROIDISM: ICD-10-CM

## 2024-05-18 DIAGNOSIS — E55.9 VITAMIN D DEFICIENCY: ICD-10-CM

## 2024-05-18 DIAGNOSIS — M75.42 SHOULDER IMPINGEMENT SYNDROME, LEFT: ICD-10-CM

## 2024-05-18 DIAGNOSIS — Z51.81 MEDICATION MONITORING ENCOUNTER: ICD-10-CM

## 2024-05-18 DIAGNOSIS — J45.20 MILD INTERMITTENT ASTHMA WITHOUT COMPLICATION (HCC): ICD-10-CM

## 2024-05-18 DIAGNOSIS — Z00.00 PREVENTATIVE HEALTH CARE: Primary | ICD-10-CM

## 2024-05-18 DIAGNOSIS — Z86.19 HISTORY OF GENITAL WARTS: ICD-10-CM

## 2024-05-18 LAB
ALBUMIN SERPL-MCNC: 4.2 G/DL (ref 3.4–5)
ALBUMIN/GLOB SERPL: 1.2 {RATIO} (ref 1–2)
ALP LIVER SERPL-CCNC: 73 U/L
ALT SERPL-CCNC: 44 U/L
ANION GAP SERPL CALC-SCNC: 6 MMOL/L (ref 0–18)
AST SERPL-CCNC: 26 U/L (ref 15–37)
BILIRUB SERPL-MCNC: 0.4 MG/DL (ref 0.1–2)
BUN BLD-MCNC: 11 MG/DL (ref 9–23)
CALCIUM BLD-MCNC: 9.1 MG/DL (ref 8.5–10.1)
CHLORIDE SERPL-SCNC: 104 MMOL/L (ref 98–112)
CHOLEST SERPL-MCNC: 211 MG/DL (ref ?–200)
CO2 SERPL-SCNC: 26 MMOL/L (ref 21–32)
CREAT BLD-MCNC: 1.09 MG/DL
EGFRCR SERPLBLD CKD-EPI 2021: 87 ML/MIN/1.73M2 (ref 60–?)
FASTING PATIENT LIPID ANSWER: YES
FASTING STATUS PATIENT QL REPORTED: YES
GLOBULIN PLAS-MCNC: 3.5 G/DL (ref 2.8–4.4)
GLUCOSE BLD-MCNC: 91 MG/DL (ref 70–99)
HDLC SERPL-MCNC: 44 MG/DL (ref 40–59)
LDLC SERPL CALC-MCNC: 148 MG/DL (ref ?–100)
NONHDLC SERPL-MCNC: 167 MG/DL (ref ?–130)
OSMOLALITY SERPL CALC.SUM OF ELEC: 281 MOSM/KG (ref 275–295)
POTASSIUM SERPL-SCNC: 4 MMOL/L (ref 3.5–5.1)
PROT SERPL-MCNC: 7.7 G/DL (ref 6.4–8.2)
SODIUM SERPL-SCNC: 136 MMOL/L (ref 136–145)
T4 FREE SERPL-MCNC: 1.1 NG/DL (ref 0.8–1.7)
THYROGLOB SERPL-MCNC: 253 U/ML (ref ?–60)
THYROPEROXIDASE AB SERPL-ACNC: 1376 U/ML (ref ?–60)
TRIGL SERPL-MCNC: 107 MG/DL (ref 30–149)
TSI SER-ACNC: 3.76 MIU/ML (ref 0.36–3.74)
VLDLC SERPL CALC-MCNC: 20 MG/DL (ref 0–30)

## 2024-05-18 PROCEDURE — 80061 LIPID PANEL: CPT | Performed by: FAMILY MEDICINE

## 2024-05-18 PROCEDURE — 86376 MICROSOMAL ANTIBODY EACH: CPT | Performed by: FAMILY MEDICINE

## 2024-05-18 PROCEDURE — 99396 PREV VISIT EST AGE 40-64: CPT | Performed by: FAMILY MEDICINE

## 2024-05-18 PROCEDURE — 84443 ASSAY THYROID STIM HORMONE: CPT | Performed by: FAMILY MEDICINE

## 2024-05-18 PROCEDURE — 86800 THYROGLOBULIN ANTIBODY: CPT | Performed by: FAMILY MEDICINE

## 2024-05-18 PROCEDURE — 84439 ASSAY OF FREE THYROXINE: CPT | Performed by: FAMILY MEDICINE

## 2024-05-18 PROCEDURE — 80053 COMPREHEN METABOLIC PANEL: CPT | Performed by: FAMILY MEDICINE

## 2024-05-18 NOTE — H&P
Cody Oliva is a 43 year old male who presents for a complete physical exam.   HPI:   Pt voices concerns of left shoulder pain , surgery 1 1/2 yrs ago.    Wt Readings from Last 6 Encounters:   05/18/24 210 lb (95.3 kg)   11/22/23 202 lb (91.6 kg)   06/23/23 207 lb (93.9 kg)   03/04/23 217 lb (98.4 kg)   07/05/22 204 lb (92.5 kg)   03/15/22 202 lb (91.6 kg)     Body mass index is 28.76 kg/m².     Cholesterol, Total (mg/dL)   Date Value   06/19/2021 224 (H)   07/02/2020 208 (H)   09/12/2015 200 (H)     CHOLESTEROL, TOTAL (mg/dL)   Date Value   06/17/2023 229 (H)   03/18/2023 222 (H)     HDL Cholesterol (mg/dL)   Date Value   06/19/2021 57   07/02/2020 47   09/12/2015 53     HDL CHOLESTEROL (mg/dL)   Date Value   06/17/2023 45   03/18/2023 43     LDL Cholesterol (mg/dL)   Date Value   06/19/2021 154 (H)   07/02/2020 147 (H)   09/12/2015 134 (H)     LDL-CHOLESTEROL (mg/dL (calc))   Date Value   06/17/2023 163 (H)   03/18/2023 154 (H)     AST (U/L)   Date Value   06/17/2023 24   06/19/2021 24   07/02/2020 25   10/04/2016 27     ALT (U/L)   Date Value   06/17/2023 29   06/19/2021 42   07/02/2020 31   10/04/2016 36      Current Outpatient Medications   Medication Sig Dispense Refill    albuterol (PROAIR HFA) 108 (90 Base) MCG/ACT Inhalation Aero Soln Inhale 2 puffs into the lungs every 4 (four) hours as needed. 8.5 g 0    levothyroxine 75 MCG Oral Tab Take 1 tablet (75 mcg total) by mouth before breakfast. 90 tablet 3     No Known Allergies     Past Medical History:    Allergic rhinitis    Fall    Anxiety    COVID-19 virus infection    Exercise-induced asthma (HCC)    History of genital warts    Hypothyroidism      Past Surgical History:   Procedure Laterality Date    Other surgical history Left 07/25/2022    Left rotator cuff repair with biceps tenodesis      Family History   Problem Relation Age of Onset    Other (ECZEMA) Daughter     Diabetes Father         pre-dm    Other (ulcers) Father     Anxiety Mother      No Known Problems Sister     Lipids Brother     No Known Problems Brother       Social History:  Social History     Socioeconomic History    Marital status:    Tobacco Use    Smoking status: Never     Passive exposure: Never    Smokeless tobacco: Never   Vaping Use    Vaping status: Never Used   Substance and Sexual Activity    Alcohol use: Yes     Alcohol/week: 3.0 - 5.0 standard drinks of alcohol     Types: 3 - 5 Shots of liquor per week     Comment: weekends    Drug use: No   Other Topics Concern    Caffeine Concern No     Comment: 3 servings    Exercise Yes    Seat Belt Yes    Special Diet No    Stress Concern Yes    Weight Concern No     Social Determinants of Health      [facility]    Social Connections    Received from [facility], [facility]    Housing Stability     Specialists: Dr Almonte-Katie ibarra, Dr Llamas -dermatology, Dr Wagner- allergist  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 Children: 2 son and daughter.   Exercise:  walks the dog.yoga for stretching  Diet: watches minimally, one serving of caffeine per day     REVIEW OF SYSTEMS:   GENERAL: generally feels well, no fatigue, denies excessive daytime drowsiness, good appetite, wt up 8# since Nov  SKIN: denies any unusual skin lesions or rashes, derm appt 1/14/23 for rosacea, crusting around eyes, resolved with doxycycline  EYES:denies blurred vision or double vision  ENT: denies nasal congestion, PND or ST, denies snoring and reported periods of apnea, denies hearing deficits, getting allergy shots monthly x 1 yr  LUNGS: some SOB ,no coughing , + wheezing, uses albuterol 2-3 x per week, Spring mostly  CARDIOVASCULAR: denies chest pain on exertion,  denies anginal equivalent symptoms, no claudication , no peripheral edema, rare palpitations  GI: denies abdominal pain,denies heartburn, constipation, diarrhea, or change in bowel  habits  : denies dysuria, hesitancy,nocturia, decreased urine stream, incontinence, erectile dysfunction, decreased libido   MUSCULOSKELETAL: denies back pain, left shoulder painful to sleep on it, decreased ROM  NEURO: denies headaches, tremors, dizziness, numbness and weakness  PSYCHE: anxiety significantly improved, rapid sleep onset, difficulty obtaining sleep with alternating shifts schedules from 1st-3rd shift  HEMATOLOGIC: denies unexplained bruising or bleeding  ENDOCRINE: denies heat or cold intolerance , no unexplained wt loss or gain  EXAM:   /72 (BP Location: Left arm, Patient Position: Sitting, Cuff Size: adult)   Pulse 79   Temp 98 °F (36.7 °C) (Temporal)   Resp 16   Ht 5' 11.65\" (1.82 m)   Wt 210 lb (95.3 kg)   SpO2 98%   BMI 28.76 kg/m²   Body mass index is 28.76 kg/m².   GENERAL: well developed, well nourished,in no apparent distress  SKIN: no rashes,no suspicious lesions  ENT: EAC:  Clear, TMs: intact, Nose: turbinates normal, septum: midline, discharge: none, Pharynx: class 1 airway  EYES:PERRLA, EOMI, normal optic disk,conjunctiva are clear  NECK: supple,no adenopathy,no bruits, no thyromegaly  LUNGS: clear to auscultation, no w/r/r  CARDIO: RRR without murmur, no S3, S4, strong peripheral pulses, no peripheral edema  GI: +NBS's,no masses, HSM or tenderness  BACK:  : FROM, No lateral curves   EXTREMITIES: no cyanosis, clubbing or edema, FROM of all joints tested  Left shoulder: Tight posterior capsule, decreased flexion compared to right, good strength against resistance  NEURO: A &O X 3,cranial nerves are intact,motor and sensory are grossly intact, DTRs +2/4 UE/LE bilaterally  PSYCH: affect: anxious, speech: clear and coherent, Insight: appropriate  ASSESSMENT AND PLAN:   Cody Oliva is a 43 year old male   Encounter Diagnoses   Name Primary?    Preventative health care Yes    Acquired hypothyroidism     Vitamin D deficiency     Hypercholesteremia     History of genital  warts     Mild intermittent asthma without complication (HCC)     Medication monitoring encounter     Shoulder impingement syndrome, left      Orders Placed This Encounter   Procedures    TSH and Free T4 [E]    Lipid Panel [E]    Comp Metabolic Panel (14) [E]    Thyroid peroxidase & thyroglobulin ab [E]     Meds & Refills for this Visit:  Requested Prescriptions      No prescriptions requested or ordered in this encounter     Imaging & Consults:  None  No follow-ups on file.  Patient Instructions   1. Preventative health care  I reviewed age-appropriate preventative health screening exams as well as immunizations.  Patient declines pneumococcal vaccine  Discussed conservative management strategies for sleep onset and maintenance, would avoid excessive alcohol which impairs quality of sleep.  Patient may benefit from melatonin 5 mg 1 hour before bedtime with shift changes  - TSH and Free T4 [E]; Future  - Lipid Panel [E]; Future  - Comp Metabolic Panel (14) [E]; Future  - TSH and Free T4 [E]  - Lipid Panel [E]  - Comp Metabolic Panel (14) [E]    2. Acquired hypothyroidism  Check thyroid function studies as well as antibodies.  Continue thyroid replacement  - TSH and Free T4 [E]; Future  - Thyroid peroxidase & thyroglobulin ab [E]; Future  - TSH and Free T4 [E]  - Thyroid peroxidase & thyroglobulin ab [E]    3. Vitamin D deficiency  Continue vitamin D supplement 5000 IU daily    4. Hypercholesteremia  Reviewed goals for lipids  - Lipid Panel [E]; Future  - Lipid Panel [E]    5. History of genital warts  Monitor clinically    6. Mild intermittent asthma without complication (HCC)  Discussed asthma control test and asthma action plan.  Patient may benefit from a controller such as inhaled corticosteroid or montelukast.  Monitor clinically for more frequent use of rescue inhaler    7. Medication monitoring encounter  Check labs  - TSH and Free T4 [E]; Future  - TSH and Free T4 [E]    8. Shoulder impingement syndrome,  left  Patient instructed on proper scapular stabilization, posterior capsule stretches and rotator cuff strengthening exercises    Dameon Powell, DO, FAAFP

## 2024-05-18 NOTE — PATIENT INSTRUCTIONS
1. Preventative health care  I reviewed age-appropriate preventative health screening exams as well as immunizations.  Patient declines pneumococcal vaccine  Discussed conservative management strategies for sleep onset and maintenance, would avoid excessive alcohol which impairs quality of sleep.  Patient may benefit from melatonin 5 mg 1 hour before bedtime with shift changes  - TSH and Free T4 [E]; Future  - Lipid Panel [E]; Future  - Comp Metabolic Panel (14) [E]; Future  - TSH and Free T4 [E]  - Lipid Panel [E]  - Comp Metabolic Panel (14) [E]    2. Acquired hypothyroidism  Check thyroid function studies as well as antibodies.  Continue thyroid replacement  - TSH and Free T4 [E]; Future  - Thyroid peroxidase & thyroglobulin ab [E]; Future  - TSH and Free T4 [E]  - Thyroid peroxidase & thyroglobulin ab [E]    3. Vitamin D deficiency  Continue vitamin D supplement 5000 IU daily    4. Hypercholesteremia  Reviewed goals for lipids  - Lipid Panel [E]; Future  - Lipid Panel [E]    5. History of genital warts  Monitor clinically    6. Mild intermittent asthma without complication (HCC)  Discussed asthma control test and asthma action plan.  Patient may benefit from a controller such as inhaled corticosteroid or montelukast.  Monitor clinically for more frequent use of rescue inhaler    7. Medication monitoring encounter  Check labs  - TSH and Free T4 [E]; Future  - TSH and Free T4 [E]    8. Shoulder impingement syndrome, left  Patient instructed on proper scapular stabilization, posterior capsule stretches and rotator cuff strengthening exercises

## 2024-05-20 ENCOUNTER — TELEPHONE (OUTPATIENT)
Dept: FAMILY MEDICINE CLINIC | Facility: CLINIC | Age: 44
End: 2024-05-20

## 2024-05-20 RX ORDER — LEVOTHYROXINE SODIUM 88 UG/1
88 TABLET ORAL
Qty: 90 TABLET | Refills: 0 | Status: SHIPPED | OUTPATIENT
Start: 2024-05-20

## 2024-05-20 NOTE — TELEPHONE ENCOUNTER
REFILL levothyroxine TO SAPNA PEREIRA, PER HIS RECENT LABS  INCREASED to 88 mcg daily, PT IS ALL OUT, PLEASE SEND TODAY

## 2024-05-21 ENCOUNTER — PATIENT MESSAGE (OUTPATIENT)
Dept: FAMILY MEDICINE CLINIC | Facility: CLINIC | Age: 44
End: 2024-05-21

## 2024-05-21 DIAGNOSIS — E06.3 HYPOTHYROIDISM DUE TO HASHIMOTO'S THYROIDITIS: Primary | ICD-10-CM

## 2024-05-21 DIAGNOSIS — E03.8 HYPOTHYROIDISM DUE TO HASHIMOTO'S THYROIDITIS: Primary | ICD-10-CM

## 2024-05-22 NOTE — TELEPHONE ENCOUNTER
From: Cody Oliva  To: Dameon Powell  Sent: 5/21/2024 9:13 PM CDT  Subject: Thyroid    I would like to see an endocrinologist. Is there one in network that you would recommend?

## 2024-06-05 ENCOUNTER — TELEPHONE (OUTPATIENT)
Dept: ENDOCRINOLOGY | Age: 44
End: 2024-06-05

## 2024-06-30 ENCOUNTER — PATIENT MESSAGE (OUTPATIENT)
Dept: FAMILY MEDICINE CLINIC | Facility: CLINIC | Age: 44
End: 2024-06-30

## 2024-06-30 DIAGNOSIS — R53.83 FATIGUE, UNSPECIFIED TYPE: Primary | ICD-10-CM

## 2024-07-01 NOTE — TELEPHONE ENCOUNTER
From: Cody Oilva  To: Dameon Powell  Sent: 6/30/2024 4:53 PM CDT  Subject: Testosterone     Hey Dr VITALE,    My energy level has been really low since the last appointment. I’m tired more than usual. I have an appointment on the 19th to get more info on my thyroid. I’m guessing that is why… also, I was wondering if it could be my testosterone. Do you suggest I take testosterone as a supplement? Last time it was checked it was a little low. That was over a year ago. Thoughts?

## 2024-07-13 ENCOUNTER — NURSE ONLY (OUTPATIENT)
Dept: FAMILY MEDICINE CLINIC | Facility: CLINIC | Age: 44
End: 2024-07-13
Payer: COMMERCIAL

## 2024-07-13 DIAGNOSIS — R53.83 FATIGUE, UNSPECIFIED TYPE: ICD-10-CM

## 2024-07-13 LAB
FSH SERPL-ACNC: 2.3 MIU/ML
LH SERPL-ACNC: 2.2 MIU/ML
PROLACTIN SERPL-MCNC: 9.7 NG/ML

## 2024-07-13 PROCEDURE — 84403 ASSAY OF TOTAL TESTOSTERONE: CPT | Performed by: FAMILY MEDICINE

## 2024-07-13 PROCEDURE — 83002 ASSAY OF GONADOTROPIN (LH): CPT | Performed by: FAMILY MEDICINE

## 2024-07-13 PROCEDURE — 36416 COLLJ CAPILLARY BLOOD SPEC: CPT | Performed by: FAMILY MEDICINE

## 2024-07-13 PROCEDURE — 84402 ASSAY OF FREE TESTOSTERONE: CPT | Performed by: FAMILY MEDICINE

## 2024-07-13 PROCEDURE — 83001 ASSAY OF GONADOTROPIN (FSH): CPT | Performed by: FAMILY MEDICINE

## 2024-07-13 PROCEDURE — 84146 ASSAY OF PROLACTIN: CPT | Performed by: FAMILY MEDICINE

## 2024-07-13 NOTE — PROGRESS NOTES
Patient is here for labs ordered by pcp. Right arm used - tolerated procedure well. No occurrences.

## 2024-07-18 LAB
FREE TESTOST DIRECT: 5.9 PG/ML
TESTOSTERONE: 210 NG/DL

## 2024-07-19 ENCOUNTER — OFFICE VISIT (OUTPATIENT)
Facility: CLINIC | Age: 44
End: 2024-07-19
Payer: COMMERCIAL

## 2024-07-19 VITALS — DIASTOLIC BLOOD PRESSURE: 82 MMHG | HEART RATE: 88 BPM | OXYGEN SATURATION: 98 % | SYSTOLIC BLOOD PRESSURE: 136 MMHG

## 2024-07-19 DIAGNOSIS — E04.9 ENLARGED THYROID: ICD-10-CM

## 2024-07-19 DIAGNOSIS — E06.3 HYPOTHYROIDISM DUE TO HASHIMOTO THYROIDITIS: Primary | ICD-10-CM

## 2024-07-19 DIAGNOSIS — R53.82 CHRONIC FATIGUE: ICD-10-CM

## 2024-07-19 DIAGNOSIS — R79.89 LOW TESTOSTERONE: ICD-10-CM

## 2024-07-19 PROCEDURE — 99205 OFFICE O/P NEW HI 60 MIN: CPT

## 2024-07-19 NOTE — PATIENT INSTRUCTIONS
Follow up with QUINCY Treadwell: Return in about 6 weeks (around 8/30/2024) for thyroid follow up, can be video visit.    Medication:  - for now, continue: Thryoid Meds: levothyroxine Tabs - 88 MCG; we will await blood work. Consider switching to brand name      Plan:  - recommend follow up with primary care for sleep study given snoring    - testosterone: complete lab work as ordered- must be done AT 8AM, fasting since midnight (no food or drink) for accuracy    - complete thyroid ultrasound. to schedule, call central scheduling at [phone]. ,     - repeat labs today. HOLD biotin and multivitamins for at least 5 days before completing the thyroid labs. Biotin can be found in hair skin and nails supplements, multivitamins, protein shakes, and energy drinks; be sure to check all labels. Biotin is SAFE to consume, but it does interfere with the blood testing results     Understanding thyroid labs:  - TSH = thyroid stimulating hormone, this is the 'signal' which turns UP or DOWN depending on how much hormone your thyroid gland is producing.    - free T4, total T3 = the thyroid hormones in your blood, this is the thyroid number you can \"Feel\"- if T4 is low, that indicates hypothyroidism, if T4 is high, that indicates hyperthyroidism    [facility] - QUINCY Treadwell  Office phone number: [phone]  Fax number: [phone]

## 2024-07-19 NOTE — PROGRESS NOTES
Endocrinology Clinic Note    Name: Cody Oliva    Date: 7/19/2024    HISTORY OF PRESENT ILLNESS   Cody Oliva is a 43 year old male with PMHx significant for asthma, hypothyroidism who presents for consultation for hypothyroidism.    Subjective:    Initial HPI consult in July 2024  Here for consultation on thyroid.  Dx'd with hypothyroidism in 2020, historically managed on levothyroxine. Recently went to PCP for annual physical and was not feeling great - questioning hypothyroidism diagnosis. Thyroid antibodies were added on, came back positive. His TSH was low so his dose was incr'd.  He still felt ongoing fatigue and states he actually felt worse after his dose adjustment, so messaged in about his testosterone (which was also low). He is interested in knowing if he should take testosterone, and generally wants to feel better.  Symptoms as noted below.     Thyroid hx:  Diagnosed with hypothyroidism in 7/2020   (-) Fhx of thyroid disease   Fam hx of thyroid cancer: (-)   History of neck/head radiation: (-)     Thryoid Meds: levothyroxine Tabs - 88 MCG - takes first thing in the morning, 30-45 mins before eating. Doesn't take MVI     SYMPTOMS: Pt endorses: weight gain (\"heaviest he's ever been\"), bloating, can't lose weight despite working out daily. Tired and fatigued. Vision changes- dry eyes. Light sensitivity on computers at work. Puffiness in face.  Occ palpitations/shortness of breath (hx of asthma). Trouble swallowing (which he states he's always had).    Pt denies:  recent illness, weight loss medication use, recent IV contrast for imaging, amiodarone use, and biotin useConstipation, diarrhea.     Re: testosterone:   - He endorses symptoms of fatigue and low libido  - No history of the following: hypothalamic or pituitary tumors/ infiltrative disease, radiation to head and neck region,  recent critical illness, head trauma, weight loss, glucocorticoid, narcotic/pain medications, anabolic  steroid use, excessive alcohol use.    + He had normal growth and development throughout puberty  - He was able to conceive two children, who are ages 12 and 14.  - No history of TOSHIA or DM, but patient does endorse snoring at night. Has to sleep on his back due to a shoulder injury, and snores when laying on back.       History/Other:    REVIEW OF SYSTEMS  Ten point review of systems has been performed and is otherwise negative and/or non-contributory, except as described above.    Medications:     Current Outpatient Medications:     levothyroxine 88 MCG Oral Tab, Take 1 tablet (88 mcg total) by mouth before breakfast., Disp: 90 tablet, Rfl: 0    albuterol (PROAIR HFA) 108 (90 Base) MCG/ACT Inhalation Aero Soln, Inhale 2 puffs into the lungs every 4 (four) hours as needed., Disp: 8.5 g, Rfl: 0     Allergies:   No Known Allergies    Social History:   Social History     Socioeconomic History    Marital status:    Tobacco Use    Smoking status: Never     Passive exposure: Never    Smokeless tobacco: Never   Vaping Use    Vaping status: Never Used   Substance and Sexual Activity    Alcohol use: Yes     Alcohol/week: 3.0 - 5.0 standard drinks of alcohol     Types: 3 - 5 Shots of liquor per week     Comment: weekends    Drug use: No    Sexual activity: Yes   Other Topics Concern    Caffeine Concern No     Comment: 3 servings    Exercise Yes    Seat Belt Yes    Special Diet No    Stress Concern Yes    Weight Concern No       Medical History:   Past Medical History:    Allergic rhinitis    Fall    Anxiety    COVID-19 virus infection    Exercise-induced asthma (HCC)    History of genital warts    Hypothyroidism         Surgical history:   Past Surgical History:   Procedure Laterality Date    Other surgical history Left 07/25/2022    Left rotator cuff repair with biceps tenodesis       Objective:    PHYSICAL EXAMINATION:  /82 (BP Location: Left arm, Patient Position: Sitting, Cuff Size: adult)   Pulse 88   SpO2  98%     CONSTITUTIONAL:  awake, alert, cooperative, no apparent distress, and appears stated age  PSYCH: normal affect  EYES:  no proptosis, lid lag, or stare; extraocular movements intact  ENT:  Normocephalic, atraumatic  NECK:  R sided thyroid > L side, non tender  MSK: No tremor noted  LUNGS: breathing comfortably  CARDIOVASCULAR:  regular rate   ABDOMEN:  soft  SKIN:  no rashes and no lesions  EXTREMITIES: no edema    Labs:  Lab Results   Component Value Date    T4F 1.1 05/18/2024    TSH 3.760 (H) 05/18/2024      Recent Labs     03/18/23  0716 06/17/23  0936 05/18/24  0939   T4F 1.1 1.3 1.1   TSH 7.43* 4.49 3.760*          Component  Ref Range & Units 5/18/24  9:39 AM   Anti-Thyroglobulin  <60 U/mL 253 High    Comment: This test may exhibit interference when a sample is collected from a person who is consuming high dose of biotin (a.k.a., vitamin B7, vitamin H, coenzyme R) supplements resulting in serum concentrations >=100 ng/mL, leading to falsely depressed Thyroglobulin results (32% - 37% decrease).  Intake of the recommended daily allowance (RDA) for biotin (0.03 mg) has not been shown to typically cause significant interference; however, high dose daily dietary supplements may contain biotin concentrations greater than 150 times (5-10 mg) the RDA.  It is recommended that physicians ask all patients who may be on biotin supplementation to stop biotin consumption at least 72 hours prior to collection of a new sample.   Anti-Thyroperoxidase  <60 U/mL 1,376 High        Imaging:  No results found.    Assessment & Plan:      ICD-10-CM    1. Hypothyroidism due to Hashimoto thyroiditis  E06.3 Thyrotropin Receptor Ab, Serum     Thyroid Stimulating Immunoglobulin     US THYROID (CPT=76536)     Triiodothyronine (T3) Total [E]      2. Chronic fatigue  R53.82 CBC With Differential With Platelet     TSH and Free T4     Vitamin B12     Vitamin D     Hemoglobin A1C [E]      3. Low testosterone  R79.89 Testosterone,  Bioavail, SHBG (Male)     PSA, Total (Screening) [E]     ACTH, Plasma     Cortisol     Ferritin     FSH AND LH     Iron And Tibc      4. Enlarged thyroid  E04.9 US THYROID (CPT=76536)          Very pleasant 43 year old male with Pmhx Hashimoto's presenting with a variety of symptoms and to discuss low testosterone and hypothyroidism.     (E06.3) Hypothyroidism due to Hashimoto thyroiditis  (primary encounter diagnosis)  Plan: Thyrotropin Receptor Ab, Serum, Thyroid         Stimulating Immunoglobulin, US THYROID         (CPT=76536), Triiodothyronine (T3) Total [E]  Hx of Hashimoto's, confirmed with antibody testing 5/2024, diagnosed in 7/2020; subclinical hypothyroidism present since ~2016 (age 36) on lab work. Patient has a variety of symptoms that are in correlation with abnormal TSH 5/2024-- states since increasing his levothyroxine dose, he actually feels worse. He is very concerned about his symptoms and is hoping to get some relief. Symptoms include weight gain, puffiness, dry eyes, fatigue, and bloating.  Patient also notes dry eyes and light sensitivity which is not entirely characteristic of thyroid eye disease. No proptosis on phys exam. Will add on TRAB/TSI out of an abundance of caution as there is some (low) potential for +antibodies with euthyroid state.     5/2024 TSH 3.760, fT4 1.10, dose incr'd from 75mcg --> 88mcg daily.     - repeat TFTs (TSH, free T4, total T3, also added on TSI/Trab) today, hold biotin containing products for 3 days prior  - continue levothyroxine 88mcg daily for now, dose pending results, discussed switch to brand name to see if sx's improve? Await lab results first  - Pt aware that if symptoms do not improve despite normalization of thyroid lab, that they are not likely related to their thyroid condition and that continued follow up with PCP is indicated.   - proper administration of medication discussed with the pt, ideally first thing in the morning, on an empty stomach and  taken only with water, separate from all other foods/beverages/medications by 30-60 minutes      (R53.82) Chronic fatigue  Plan: CBC With Differential With Platelet, TSH and         Free T4, Vitamin B12, Vitamin D, Hemoglobin A1C  Reviewed that fatigue is often multifactorial and we have reviewed the common etiologies, both endocrine and non-endocrine.  - thyroid management plan as noted above  - no anemia per CBC   - pt reports snoring/TOSHIA -- follow up with PCP on sleep study  - vit D- repeat, will order replacement if deficient  - vit B12 - repeat  - reviewed if all above normal, pt will continue to follow up with PCP      (R79.89) Low testosterone  Plan: Testosterone, Bioavail, SHBG (Male), PSA, Total        (Screening) [E], ACTH, Plasma, Cortisol,         Ferritin, FSH AND LH, Iron And Tibc  This is a 43 year old male who presents with symptoms of low testosterone including fatigue, low libido. Previously no issues with fertility-- has two healthy children.  Serum total testosterone was 210 on testing 6/2024; this result was completed at 8am and fasting. Has not repeated results. No associated SHBG result. LH 2.2, and FSH 2.3. We reviewed etiologies of primary and secondary hypogonadism and based on clinical history, likely this is secondary hypogonadism >primary    - will check FSH/LH, PRL, iron/ferritin, ACTH/cortisol, TSH/fT4; repeat total T  - if additional pituitary abnormality, then will pursue MRI sella; first await labs  - he has potential untreated TOSHIA; recommend he first address that problem with PCP and complete sleep study before pursuit of TRT therapy  - will review with collab physician once results are back  -Counseled on hold on the contraindications for TRT as well as potential side effects of TRT, patient verbalizes understanding    (E04.9) Enlarged thyroid  Plan: US THYROID (CPT=76536)  R sided thyroid > L side on phys exam (-) obstructive sx's, (-) fam hx thyroid ca  - 1x baseline ultrasound      A total of 70 minutes was spent today on obtaining history, reviewing pertinent labs, evaluating patient, providing multiple treatment options, reinforcing diet/exercise and compliance, and completing documentation.      Return in about 6 weeks (around 8/30/2024) for thyroid follow up, can be video visit.    7/19/2024  QUINCY Treadwell

## 2024-07-20 ENCOUNTER — LAB ENCOUNTER (OUTPATIENT)
Dept: LAB | Age: 44
End: 2024-07-20
Attending: FAMILY MEDICINE
Payer: COMMERCIAL

## 2024-07-20 DIAGNOSIS — E78.00 HYPERCHOLESTEREMIA: ICD-10-CM

## 2024-07-20 DIAGNOSIS — E06.3 HYPOTHYROIDISM DUE TO HASHIMOTO THYROIDITIS: ICD-10-CM

## 2024-07-20 DIAGNOSIS — R53.82 CHRONIC FATIGUE: ICD-10-CM

## 2024-07-20 DIAGNOSIS — Z79.899 ENCOUNTER FOR LONG-TERM (CURRENT) DRUG USE: ICD-10-CM

## 2024-07-20 DIAGNOSIS — R79.89 LOW TESTOSTERONE: ICD-10-CM

## 2024-07-20 DIAGNOSIS — E03.9 ACQUIRED HYPOTHYROIDISM: ICD-10-CM

## 2024-07-20 LAB
ALBUMIN SERPL-MCNC: 4.8 G/DL (ref 3.2–4.8)
ALBUMIN/GLOB SERPL: 1.6 {RATIO} (ref 1–2)
ALP LIVER SERPL-CCNC: 73 U/L
ALT SERPL-CCNC: 41 U/L
ANION GAP SERPL CALC-SCNC: 3 MMOL/L (ref 0–18)
AST SERPL-CCNC: 37 U/L (ref ?–34)
BASOPHILS # BLD AUTO: 0.06 X10(3) UL (ref 0–0.2)
BASOPHILS NFR BLD AUTO: 1 %
BILIRUB SERPL-MCNC: 0.7 MG/DL (ref 0.3–1.2)
BUN BLD-MCNC: 10 MG/DL (ref 9–23)
CALCIUM BLD-MCNC: 9.7 MG/DL (ref 8.7–10.4)
CHLORIDE SERPL-SCNC: 105 MMOL/L (ref 98–112)
CO2 SERPL-SCNC: 29 MMOL/L (ref 21–32)
COMPLEXED PSA SERPL-MCNC: 0.32 NG/ML (ref ?–4)
CORTIS SERPL-MCNC: 12.9 UG/DL
CREAT BLD-MCNC: 0.81 MG/DL
DEPRECATED HBV CORE AB SER IA-ACNC: 171.1 NG/ML
EGFRCR SERPLBLD CKD-EPI 2021: 114 ML/MIN/1.73M2 (ref 60–?)
EOSINOPHIL # BLD AUTO: 0.43 X10(3) UL (ref 0–0.7)
EOSINOPHIL NFR BLD AUTO: 6.9 %
ERYTHROCYTE [DISTWIDTH] IN BLOOD BY AUTOMATED COUNT: 12 %
EST. AVERAGE GLUCOSE BLD GHB EST-MCNC: 105 MG/DL (ref 68–126)
FASTING STATUS PATIENT QL REPORTED: YES
FSH SERPL-ACNC: 3.2 MIU/ML
GLOBULIN PLAS-MCNC: 3 G/DL (ref 2.8–4.4)
GLUCOSE BLD-MCNC: 83 MG/DL (ref 70–99)
HBA1C MFR BLD: 5.3 % (ref ?–5.7)
HCT VFR BLD AUTO: 40.5 %
HGB BLD-MCNC: 14.1 G/DL
IMM GRANULOCYTES # BLD AUTO: 0.01 X10(3) UL (ref 0–1)
IMM GRANULOCYTES NFR BLD: 0.2 %
IRON SATN MFR SERPL: 38 %
IRON SERPL-MCNC: 128 UG/DL
LH SERPL-ACNC: 3 MIU/ML
LYMPHOCYTES # BLD AUTO: 2.33 X10(3) UL (ref 1–4)
LYMPHOCYTES NFR BLD AUTO: 37.2 %
MCH RBC QN AUTO: 31.6 PG (ref 26–34)
MCHC RBC AUTO-ENTMCNC: 34.8 G/DL (ref 31–37)
MCV RBC AUTO: 90.8 FL
MONOCYTES # BLD AUTO: 0.45 X10(3) UL (ref 0.1–1)
MONOCYTES NFR BLD AUTO: 7.2 %
NEUTROPHILS # BLD AUTO: 2.99 X10 (3) UL (ref 1.5–7.7)
NEUTROPHILS # BLD AUTO: 2.99 X10(3) UL (ref 1.5–7.7)
NEUTROPHILS NFR BLD AUTO: 47.5 %
OSMOLALITY SERPL CALC.SUM OF ELEC: 282 MOSM/KG (ref 275–295)
PLATELET # BLD AUTO: 323 10(3)UL (ref 150–450)
POTASSIUM SERPL-SCNC: 4 MMOL/L (ref 3.5–5.1)
PROT SERPL-MCNC: 7.8 G/DL (ref 5.7–8.2)
RBC # BLD AUTO: 4.46 X10(6)UL
SODIUM SERPL-SCNC: 137 MMOL/L (ref 136–145)
T3 SERPL-MCNC: 1.12 NG/ML (ref 0.6–1.81)
T4 FREE SERPL-MCNC: 1.6 NG/DL (ref 0.8–1.7)
TOTAL IRON BINDING CAPACITY: 340 UG/DL (ref 250–425)
TRANSFERRIN SERPL-MCNC: 253 MG/DL (ref 215–365)
TSI SER-ACNC: 3.24 MIU/ML (ref 0.55–4.78)
VIT B12 SERPL-MCNC: 790 PG/ML (ref 211–911)
VIT D+METAB SERPL-MCNC: 40.1 NG/ML (ref 30–100)
WBC # BLD AUTO: 6.3 X10(3) UL (ref 4–11)

## 2024-07-20 PROCEDURE — 84443 ASSAY THYROID STIM HORMONE: CPT

## 2024-07-20 PROCEDURE — 82024 ASSAY OF ACTH: CPT

## 2024-07-20 PROCEDURE — 83036 HEMOGLOBIN GLYCOSYLATED A1C: CPT

## 2024-07-20 PROCEDURE — 82728 ASSAY OF FERRITIN: CPT

## 2024-07-20 PROCEDURE — 84439 ASSAY OF FREE THYROXINE: CPT

## 2024-07-20 PROCEDURE — 83001 ASSAY OF GONADOTROPIN (FSH): CPT

## 2024-07-20 PROCEDURE — 82306 VITAMIN D 25 HYDROXY: CPT

## 2024-07-20 PROCEDURE — 82533 TOTAL CORTISOL: CPT

## 2024-07-20 PROCEDURE — 84480 ASSAY TRIIODOTHYRONINE (T3): CPT

## 2024-07-20 PROCEDURE — 80053 COMPREHEN METABOLIC PANEL: CPT

## 2024-07-20 PROCEDURE — 83520 IMMUNOASSAY QUANT NOS NONAB: CPT

## 2024-07-20 PROCEDURE — 85025 COMPLETE CBC W/AUTO DIFF WBC: CPT

## 2024-07-20 PROCEDURE — 83540 ASSAY OF IRON: CPT

## 2024-07-20 PROCEDURE — 83002 ASSAY OF GONADOTROPIN (LH): CPT

## 2024-07-20 PROCEDURE — 84445 ASSAY OF TSI GLOBULIN: CPT

## 2024-07-20 PROCEDURE — 36415 COLL VENOUS BLD VENIPUNCTURE: CPT

## 2024-07-20 PROCEDURE — 84410 TESTOSTERONE BIOAVAILABLE: CPT

## 2024-07-20 PROCEDURE — 82607 VITAMIN B-12: CPT

## 2024-07-20 PROCEDURE — 83550 IRON BINDING TEST: CPT

## 2024-07-20 PROCEDURE — 84270 ASSAY OF SEX HORMONE GLOBUL: CPT

## 2024-07-21 LAB — ACTH: 23 PG/ML

## 2024-07-22 ENCOUNTER — TELEPHONE (OUTPATIENT)
Dept: FAMILY MEDICINE CLINIC | Facility: CLINIC | Age: 44
End: 2024-07-22

## 2024-07-22 DIAGNOSIS — R06.83 SNORING: Primary | ICD-10-CM

## 2024-07-22 LAB
THY STIM IMMUNO: 0.28 IU/L
THYROTROPIN REC AB: <1.1 IU/L

## 2024-07-22 NOTE — TELEPHONE ENCOUNTER
See below.  Pt saw sarai WESTON on 7/19/24 who suggested a sleep study.    Her note states \"No hx of TOSHIA or DM, but pt does endorse snoring at night. Has to sleep on his back due to a shoulder injury, and snores when laying on back.\".  ALSO---- \"he has potential TOSHIA. Recommend he first address that problem with PCP and complete sleep study before pursuit of TRT.\"    Will you order a sleep study?

## 2024-07-28 ENCOUNTER — PATIENT MESSAGE (OUTPATIENT)
Facility: CLINIC | Age: 44
End: 2024-07-28

## 2024-07-29 DIAGNOSIS — E06.3 HYPOTHYROIDISM DUE TO HASHIMOTO THYROIDITIS: Primary | ICD-10-CM

## 2024-07-29 LAB
BIOAVAILABLE TESTOSTERONE, %: 56.5 %
BIOAVAILABLE TESTOSTERONE: 179 NG/DL
SEX HORMONE BINDING GLOBULIN: 22.7 NMOL/L
TESTOSTERONE, TOTAL: 317 NG/DL

## 2024-08-02 ENCOUNTER — OFFICE VISIT (OUTPATIENT)
Dept: SLEEP CENTER | Age: 44
End: 2024-08-02
Attending: Other
Payer: COMMERCIAL

## 2024-08-02 DIAGNOSIS — R06.83 SNORING: ICD-10-CM

## 2024-08-02 PROCEDURE — 95806 SLEEP STUDY UNATT&RESP EFFT: CPT

## 2024-08-08 ENCOUNTER — HOSPITAL ENCOUNTER (OUTPATIENT)
Dept: ULTRASOUND IMAGING | Age: 44
Discharge: HOME | End: 2024-08-08
Payer: COMMERCIAL

## 2024-08-08 ENCOUNTER — SLEEP STUDY (OUTPATIENT)
Facility: CLINIC | Age: 44
End: 2024-08-08
Payer: COMMERCIAL

## 2024-08-08 DIAGNOSIS — E06.3 HYPOTHYROIDISM DUE TO HASHIMOTO THYROIDITIS: ICD-10-CM

## 2024-08-08 DIAGNOSIS — G47.30 SLEEP APNEA, UNSPECIFIED TYPE: Primary | ICD-10-CM

## 2024-08-08 DIAGNOSIS — E04.9 ENLARGED THYROID: ICD-10-CM

## 2024-08-08 PROCEDURE — 76536 US EXAM OF HEAD AND NECK: CPT

## 2024-08-08 PROCEDURE — 95806 SLEEP STUDY UNATT&RESP EFFT: CPT | Performed by: INTERNAL MEDICINE

## 2024-08-14 ENCOUNTER — TELEPHONE (OUTPATIENT)
Dept: FAMILY MEDICINE CLINIC | Facility: CLINIC | Age: 44
End: 2024-08-14

## 2024-08-14 RX ORDER — LEVOTHYROXINE SODIUM 88 UG/1
88 TABLET ORAL
Qty: 90 TABLET | Refills: 0 | Status: SHIPPED | OUTPATIENT
Start: 2024-08-14

## 2024-08-20 ENCOUNTER — TELEPHONE (OUTPATIENT)
Facility: CLINIC | Age: 44
End: 2024-08-20

## 2024-08-20 DIAGNOSIS — E21.5 PARATHYROID ABNORMALITY (HCC): Primary | ICD-10-CM

## 2024-08-20 DIAGNOSIS — E06.3 HYPOTHYROIDISM DUE TO HASHIMOTO THYROIDITIS: Primary | ICD-10-CM

## 2024-08-20 NOTE — PROGRESS NOTES
Given prominent PTH glands on ultrasound will check PTH however Ca historically normal  I am use dx code  \"Parathyroid abnormality\" as it is large but this may just be anatomically normal for him

## 2024-08-20 NOTE — TELEPHONE ENCOUNTER
Lab result note: Checked thyroid ultrasound in context of hashimoto's  Thyroid is non nodular  Incidentally, there are nodules outside of the thyroid noted that may be prominent parathyroid glands or lymph nodes. His calcium has been historically normal so I have low suspicion for parathyroid problem but will check PTH function and repeat Ca just to be sure. Those labs already ordered in TE    RN phoned patient to discuss thyroid labs- patient verbalized understanding and would like follow up [facility] message with information. Patient would like to get thyroid labs repeated at this time as well. Last set on 7/20- reviewed Saba wanted repeat in 4-6 weeks, timing is ok for this.    [facility] follow up sent to patient. Thyroid labs ordered per written order from 7/20 result note.

## 2024-08-24 ENCOUNTER — NURSE ONLY (OUTPATIENT)
Dept: FAMILY MEDICINE CLINIC | Facility: CLINIC | Age: 44
End: 2024-08-24
Payer: COMMERCIAL

## 2024-08-24 DIAGNOSIS — E06.3 HYPOTHYROIDISM DUE TO HASHIMOTO THYROIDITIS: ICD-10-CM

## 2024-08-24 DIAGNOSIS — E21.5 PARATHYROID ABNORMALITY (HCC): ICD-10-CM

## 2024-08-24 LAB
CALCIUM BLD-MCNC: 9.7 MG/DL (ref 8.7–10.4)
PTH-INTACT SERPL-MCNC: 38.7 PG/ML (ref 18.5–88)
T4 FREE SERPL-MCNC: 1.5 NG/DL (ref 0.8–1.7)
TSI SER-ACNC: 2.94 MIU/ML (ref 0.55–4.78)

## 2024-08-24 PROCEDURE — 84439 ASSAY OF FREE THYROXINE: CPT

## 2024-08-24 PROCEDURE — 84443 ASSAY THYROID STIM HORMONE: CPT

## 2024-08-24 PROCEDURE — 83970 ASSAY OF PARATHORMONE: CPT

## 2024-08-24 PROCEDURE — 82310 ASSAY OF CALCIUM: CPT

## 2024-08-24 NOTE — PROGRESS NOTES
Patient is here for labs ordered by Toshia Salas APN - okay per WILBERT Agrawal    Patient tolerated procedure well. Right arm used. No occurrences.

## 2024-09-04 ENCOUNTER — TELEMEDICINE (OUTPATIENT)
Facility: CLINIC | Age: 44
End: 2024-09-04
Payer: COMMERCIAL

## 2024-09-04 VITALS — HEIGHT: 71 IN | WEIGHT: 214 LBS | BODY MASS INDEX: 29.96 KG/M2

## 2024-09-04 DIAGNOSIS — E04.9 ENLARGED THYROID: ICD-10-CM

## 2024-09-04 DIAGNOSIS — R53.82 CHRONIC FATIGUE: ICD-10-CM

## 2024-09-04 DIAGNOSIS — R79.89 LOW TESTOSTERONE: ICD-10-CM

## 2024-09-04 DIAGNOSIS — E06.3 HYPOTHYROIDISM DUE TO HASHIMOTO THYROIDITIS: Primary | ICD-10-CM

## 2024-09-04 PROCEDURE — 99214 OFFICE O/P EST MOD 30 MIN: CPT

## 2024-09-04 RX ORDER — LEVOTHYROXINE SODIUM 88 UG/1
88 TABLET ORAL
Qty: 90 TABLET | Refills: 1 | Status: SHIPPED | OUTPATIENT
Start: 2024-09-04

## 2024-09-04 NOTE — PROGRESS NOTES
[facility] [place] [facility]ology Clinic Note - Telemedicine    Cody Oliva verbally consents to a Telemedicine (Audio + Video) visit on 9/4/2024. The visit was conducted in a private patient room.      Subjective:    Initial HPI consult in July 2024  Here for consultation on thyroid.  Dx'd with hypothyroidism in 2020, historically managed on levothyroxine. Recently went to PCP for annual physical and was not feeling great - questioning hypothyroidism diagnosis. Thyroid antibodies were added on, came back positive. His TSH was low so his dose was incr'd.  He still felt ongoing fatigue and states he actually felt worse after his dose adjustment, so messaged in about his testosterone (which was also low). He is interested in knowing if he should take testosterone, and generally wants to feel better.  Symptoms as noted below.     Thyroid hx:  Diagnosed with hypothyroidism in 7/2020   (-) Fhx of thyroid disease   Fam hx of thyroid cancer: (-)   History of neck/head radiation: (-)     Thryoid Meds: levothyroxine Tabs - 88 MCG - takes first thing in the morning, 30-45 mins before eating. Doesn't take MVI     SYMPTOMS: Pt endorses: weight gain (\"heaviest he's ever been\"), bloating, can't lose weight despite working out daily. Tired and fatigued. Vision changes- dry eyes. Light sensitivity on computers at work. Puffiness in face.  Occ palpitations/shortness of breath (hx of asthma). Trouble swallowing (which he states he's always had).    Pt denies:  recent illness, weight loss medication use, recent IV contrast for imaging, amiodarone use, and biotin useConstipation, diarrhea.     Re: testosterone:   - He endorses symptoms of fatigue and low libido  - No history of the following: hypothalamic or pituitary tumors/ infiltrative disease, radiation to head and neck region,  recent critical illness, head trauma, weight loss, glucocorticoid, narcotic/pain medications, anabolic steroid use, excessive alcohol use.    + He had normal  growth and development throughout puberty  - He was able to conceive two children, who are ages 12 and 14.  - No history of TOSHIA or DM, but patient does endorse snoring at night. Has to sleep on his back due to a shoulder injury, and snores when laying on back.     Interim hx:  9/4/2024- w/ endo QUINCY Walter, re: thyroid. Video visit.  Completed labs as noted below-- repeat total testosterone 317.  Feeling slightly better but ongoing fatigue. Still frustrated w/ testosterone levels. Still some ongoing bloating/weight gain. Ongoing dry eyes. Completed sleep study, workup for TOSHIA was negative.   8/2024 labs: TSH 2.942, fT4 1.50- levothyroxine 88mcg daily, total testosterone 317, cortisol 12.9      History/Other:    REVIEW OF SYSTEMS  Ten point review of systems has been performed and is otherwise negative and/or non-contributory, except as described above.    Medications:     Current Outpatient Medications:     LEVOXYL 88 MCG Oral Tab, Take 1 tablet (88 mcg total) by mouth before breakfast., Disp: 90 tablet, Rfl: 1    levothyroxine 88 MCG Oral Tab, Take 1 tablet (88 mcg total) by mouth before breakfast., Disp: 90 tablet, Rfl: 0    albuterol (PROAIR HFA) 108 (90 Base) MCG/ACT Inhalation Aero Soln, Inhale 2 puffs into the lungs every 4 (four) hours as needed., Disp: 8.5 g, Rfl: 0     Allergies:   No Known Allergies    Social History:   Social History     Socioeconomic History    Marital status:    Tobacco Use    Smoking status: Never     Passive exposure: Never    Smokeless tobacco: Never   Vaping Use    Vaping status: Never Used   Substance and Sexual Activity    Alcohol use: Yes     Alcohol/week: 3.0 - 5.0 standard drinks of alcohol     Types: 3 - 5 Shots of liquor per week     Comment: weekends    Drug use: No    Sexual activity: Yes   Other Topics Concern    Caffeine Concern No     Comment: 3 servings    Exercise Yes    Seat Belt Yes    Special Diet No    Stress Concern Yes    Weight Concern No       Medical  History:   Past Medical History:    Allergic rhinitis    Fall    Anxiety    COVID-19 virus infection    Exercise-induced asthma (HCC)    History of genital warts    Hypothyroidism         Surgical history:   Past Surgical History:   Procedure Laterality Date    Other surgical history Left 07/25/2022    Left rotator cuff repair with biceps tenodesis       Objective:    PHYSICAL EXAM  Limited due to telemedicine encounter    Constitutional Not in acute distress  Pulmonary: no wheezing heard  No coughing on the phone. Speaking in full sentences   Neurological:  Alert and oriented to person, place and time.   Psychiatric: Normal affect, mood and behavior appropriate    Wt Readings from Last 6 Encounters:   09/04/24 214 lb (97.1 kg)   05/18/24 210 lb (95.3 kg)   11/22/23 202 lb (91.6 kg)   06/23/23 207 lb (93.9 kg)   03/04/23 217 lb (98.4 kg)   07/05/22 204 lb (92.5 kg)       Labs:  Lab Results   Component Value Date    A1C 5.3 07/20/2024      Recent Labs     05/18/24  0939 07/20/24  0713 08/24/24  0831   T4F 1.1 1.6 1.5   TSH 3.760* 3.240 2.942          Component  Ref Range & Units 5/18/24  9:39 AM   Anti-Thyroglobulin  <60 U/mL 253 High    Comment:    Anti-Thyroperoxidase  <60 U/mL 1,376 High              Component  Ref Range & Units 7/20/24  7:13 AM   Testosterone, Total  ng/dL 317   Comment: This test was developed and its performance characteristics determined by [employer]. It has not been cleared or approved by the Food and Drug Administration. Reference Range:  Adult Males  >18 years    264 - 916  This [facility] method is currently certified by the CDC Hormone Standardization Program (HoST).  Adult male  reference interval is based on a population of healthy nonobese males (BMI <30) between 19 and 39 years old.  Stacey, et.al. JCEM 2017,102;8919-3914 PMID: 70840086.   Bioavailable Testosterone, S  ng/dL 179   Comment: Reference Range:  Males (40 - 49y): 95 - 350   Bioavailable Testosterone, %  % 56.5   Sex  Hormone Binding Globulin  nmol/L 22.7     IMAGING:   Thyroid ultrasound results:  US THYROID (CPT=76536)    Result Date: 8/8/2024  CONCLUSION:   1. Heterogeneous thyroid enlargement without intra thyroid mass or defined intra thyroid nodule, findings are consistent with chronic thyroid disease.  2. Nodules are seen external to the thyroid gland as described above, may reflect prominent parathyroid glands versus lymph nodes.  Suggest correlation with parathyroid hormone studies, and suggest continued clinical and ultrasound follow-up.    ACR TI-RADS recommendations: TR5 - FNA if greater than or equal to 1 cm, follow-up if 0.5-0.9 cm every year for 5 years. TR4 - FNA if greater than or equal to 1.5 cm, follow-up if 1-1.4 cm in 1, 2, 3 and 5 years. TR3 - FNA if greater than or equal to 2.5 cm, follow-up if 1.5-2.4 cm in 1, 3 and 5 years TR1 and TR2 - no FNA or follow-up  Please note ACR TI-RADS recommendations are based on imaging features and size of the nodule only.  In certain clinical circumstances additional or alternative evaluation may be indicated.   LOCATION:  [id number]        Dictated by (CST): Jeremie Schaeffer MD on 8/08/2024 at 1:23 PM     Finalized by (CST): Jeremie Schaeffer MD on 8/08/2024 at 1:26 PM         Assessment & Plan:      ICD-10-CM    1. Hypothyroidism due to Hashimoto thyroiditis  E06.3 LEVOXYL 88 MCG Oral Tab     TSH and Free T4 [E]      2. Chronic fatigue  R53.82       3. Low testosterone  R79.89 Testosterone, Bioavail, SHBG (Male)      4. Enlarged thyroid  E04.9           Very pleasant 43 year old male with PMHx Hashimoto's presenting with a variety of symptoms and to discuss low testosterone and hypothyroidism.     (E06.3) Hypothyroidism due to Hashimoto thyroiditis  (primary encounter diagnosis)  Plan:   Hx of Hashimoto's, confirmed with antibody testing 5/2024, diagnosed in 7/2020; subclinical hypothyroidism present since ~2016 (age 36) on lab work.  Initially presented with symptoms including  weight gain, puffiness, dry eyes, fatigue, and bloating.  Symptoms are ongoing, despite normalization of thyroid lab.  We will switch him to a brand-name thyroid medication to see if this makes any difference in symptoms.    5/2024 TSH 3.760, fT4 1.10, dose incr'd from 75mcg --> 88mcg daily.   8/2024 TSH 2.942, fT4 1.50- taking levothyroxine 88mcg daily    - repeat TFTs in 2 months after starting new medication  - Switch levothyroxine --> Levoxyl 88 mcg daily  - Pt aware that if symptoms do not improve despite normalization of thyroid lab, that they are not likely related to their thyroid condition and that continued follow up with PCP is indicated.   - proper administration of medication discussed with the pt, ideally first thing in the morning, on an empty stomach and taken only with water, separate from all other foods/beverages/medications by 30-60 minutes    (R53.82) Chronic fatigue  Plan:  Reviewed that fatigue is often multifactorial and we have reviewed the common etiologies, both endocrine and non-endocrine.  - thyroid management plan as noted above  - no anemia per CBC   - pt reports snoring/TOSHIA --sleep study workup was negative  - vit D-within normal range July 2024  - vit B12 -normal July 2024  - reviewed if all above normal, pt will continue to follow up with PCP      (R79.89) Low testosterone  Plan:   Presented w/ symptoms of low testosterone including fatigue, low libido. Previously no issues with fertility-- has two healthy children.  Serum total testosterone was 210 on testing 6/2024; this result was completed at 8am and fasting.  This was in the context of abnormal thyroid labs.  After optimizing levothyroxine dose, repeat testing total T was 310.  At this time-- total testosterone does not warrant replacement.  Will continue to optimize hypothyroidism management, as primary hypothyroidism can cause hypogonadism due to elevated TRH.  I suspect his testosterone result normalized with his dose  optimization of levothyroxine done around June 2024. Workup for TOSHIA was negative.  Plan to repeat total testosterone results in 2 months with his repeat thyroid labs  -Thyroid management as above  - Repeat total testosterone in 2 months, must be completed at 8 AM and fasting    (E04.9) Enlarged thyroid  Plan:   Thyroid ultrasound 8/2024 with heterogenous thyroid tissue, c/w chronic thyroid disease. There were prominent nodules, described as either lymph nodes vs parathyroid gland. PTH/serum Ca normal, less likely parathyroid gland prominence.   - continue follow up with PCP       Return in about 2 months (around 11/11/2024) for thyroid follow up, can be video visit.    9/4/2024  QUINCY Treadwell

## 2024-09-19 ENCOUNTER — PATIENT MESSAGE (OUTPATIENT)
Facility: CLINIC | Age: 44
End: 2024-09-19

## 2024-11-05 ENCOUNTER — PATIENT MESSAGE (OUTPATIENT)
Facility: CLINIC | Age: 44
End: 2024-11-05

## 2024-11-06 ENCOUNTER — LAB ENCOUNTER (OUTPATIENT)
Dept: LAB | Age: 44
End: 2024-11-06
Payer: COMMERCIAL

## 2024-11-06 DIAGNOSIS — E03.9 ACQUIRED HYPOTHYROIDISM: ICD-10-CM

## 2024-11-06 DIAGNOSIS — R79.89 LOW TESTOSTERONE: ICD-10-CM

## 2024-11-06 DIAGNOSIS — E06.3 HYPOTHYROIDISM DUE TO HASHIMOTO THYROIDITIS: ICD-10-CM

## 2024-11-06 LAB
T3 SERPL-MCNC: 1.28 NG/ML (ref 0.6–1.81)
T4 FREE SERPL-MCNC: 1.3 NG/DL (ref 0.8–1.7)
TSI SER-ACNC: 3.11 UIU/ML (ref 0.55–4.78)

## 2024-11-06 PROCEDURE — 84270 ASSAY OF SEX HORMONE GLOBUL: CPT

## 2024-11-06 PROCEDURE — 84439 ASSAY OF FREE THYROXINE: CPT

## 2024-11-06 PROCEDURE — 84410 TESTOSTERONE BIOAVAILABLE: CPT

## 2024-11-06 PROCEDURE — 36415 COLL VENOUS BLD VENIPUNCTURE: CPT

## 2024-11-06 PROCEDURE — 84443 ASSAY THYROID STIM HORMONE: CPT

## 2024-11-06 PROCEDURE — 84480 ASSAY TRIIODOTHYRONINE (T3): CPT

## 2024-11-06 NOTE — PROGRESS NOTES
[facility] [place] [facility] Note - Telemedicine    Cody Oliva verbally consents to a Telemedicine (Audio + Video) visit on 11/8/2024. The visit was conducted in a private patient room.      Subjective:    Initial HPI consult in July 2024  Here for consultation on thyroid.  Dx'd with hypothyroidism in 2020, historically managed on levothyroxine. Recently went to PCP for annual physical and was not feeling great - questioning hypothyroidism diagnosis. Thyroid antibodies were added on, came back positive. His TSH was low so his dose was incr'd.  He still felt ongoing fatigue and states he actually felt worse after his dose adjustment, so messaged in about his testosterone (which was also low). He is interested in knowing if he should take testosterone, and generally wants to feel better.  Symptoms as noted below.     Thyroid hx:  Diagnosed with hypothyroidism in 7/2020   (-) Fhx of thyroid disease   Fam hx of thyroid cancer: (-)   History of neck/head radiation: (-)     Thryoid Meds: levothyroxine Tabs - 88 MCG - takes first thing in the morning, 30-45 mins before eating. Doesn't take MVI     SYMPTOMS: Pt endorses: weight gain (\"heaviest he's ever been\"), bloating, can't lose weight despite working out daily. Tired and fatigued. Vision changes- dry eyes. Light sensitivity on computers at work. Puffiness in face.  Occ palpitations/shortness of breath (hx of asthma). Trouble swallowing (which he states he's always had).    Pt denies:  recent illness, weight loss medication use, recent IV contrast for imaging, amiodarone use, and biotin useConstipation, diarrhea.     Re: testosterone:   - He endorses symptoms of fatigue and low libido  - No history of the following: hypothalamic or pituitary tumors/ infiltrative disease, radiation to head and neck region,  recent critical illness, head trauma, weight loss, glucocorticoid, narcotic/pain medications, anabolic steroid use, excessive alcohol use.    + He had normal  growth and development throughout puberty  - He was able to conceive two children, who are ages 12 and 14.  - No history of TOSHIA or DM, but patient does endorse snoring at night. Has to sleep on his back due to a shoulder injury, and snores when laying on back.     Interim hx:  9/4/2024- w/ endo QUINCY Walter, re: thyroid. Video visit.  Completed labs as noted below-- repeat total testosterone 317.  Feeling slightly better but ongoing fatigue. Still frustrated w/ testosterone levels. Still some ongoing bloating/weight gain. Ongoing dry eyes.   Completed sleep study, workup for TOSHIA was negative.   8/2024 labs: TSH 2.942, fT4 1.50- levothyroxine 88mcg daily, total testosterone 317, cortisol 12.9    11/6/2024- w/ endo QUINCY Walter, re: thyroid. Video visit.   Last office visit switched from generic to brand name levoxyl  Notes difficulty sleeping, heart feels like it’s beating out of his chest, anxiety  Weight hasn't really changed. Doing intermittent fasting-- won't eat until 10am  Eyes don't feel as dry  Thyroid Meds: Levoxyl 88 MCG (taking at 4am, right before fasted workout)     Thyroid labs:  Recent Labs     07/20/24  0713 08/24/24  0831 11/06/24  0905   T4F 1.6 1.5 1.3   TSH 3.240 2.942 3.106        History/Other:    REVIEW OF SYSTEMS  Ten point review of systems has been performed and is otherwise negative and/or non-contributory, except as described above.    Medications:     Current Outpatient Medications:     LEVOXYL 88 MCG Oral Tab, Take 1 tablet (88 mcg total) by mouth before breakfast., Disp: 90 tablet, Rfl: 1    levothyroxine 88 MCG Oral Tab, Take 1 tablet (88 mcg total) by mouth before breakfast., Disp: 90 tablet, Rfl: 0    albuterol (PROAIR HFA) 108 (90 Base) MCG/ACT Inhalation Aero Soln, Inhale 2 puffs into the lungs every 4 (four) hours as needed., Disp: 8.5 g, Rfl: 0     Allergies:   No Known Allergies    Social History:   Social History     Socioeconomic History    Marital status:    Tobacco Use     Smoking status: Never     Passive exposure: Never    Smokeless tobacco: Never   Vaping Use    Vaping status: Never Used   Substance and Sexual Activity    Alcohol use: Yes     Alcohol/week: 3.0 - 5.0 standard drinks of alcohol     Types: 3 - 5 Shots of liquor per week     Comment: weekends    Drug use: No    Sexual activity: Yes   Other Topics Concern    Caffeine Concern No     Comment: 3 servings    Exercise Yes    Seat Belt Yes    Special Diet No    Stress Concern Yes    Weight Concern No       Medical History:   Past Medical History:    Allergic rhinitis    Fall    Anxiety    COVID-19 virus infection    Exercise-induced asthma (HCC)    History of genital warts    Hypothyroidism         Surgical history:   Past Surgical History:   Procedure Laterality Date    Other surgical history Left 07/25/2022    Left rotator cuff repair with biceps tenodesis       Objective:    PHYSICAL EXAM  Limited due to telemedicine encounter    Constitutional Not in acute distress  Pulmonary: no wheezing heard  No coughing on the phone. Speaking in full sentences   Neurological:  Alert and oriented to person, place and time.   Psychiatric: Normal affect, mood and behavior appropriate    Wt Readings from Last 6 Encounters:   09/04/24 214 lb (97.1 kg)   05/18/24 210 lb (95.3 kg)   11/22/23 202 lb (91.6 kg)   06/23/23 207 lb (93.9 kg)   03/04/23 217 lb (98.4 kg)   07/05/22 204 lb (92.5 kg)       Labs:  Lab Results   Component Value Date    A1C 5.3 07/20/2024      Recent Labs     07/20/24  0713 08/24/24  0831 11/06/24  0905   T4F 1.6 1.5 1.3   TSH 3.240 2.942 3.106          Component  Ref Range & Units 5/18/24  9:39 AM   Anti-Thyroglobulin  <60 U/mL 253 High    Comment:    Anti-Thyroperoxidase  <60 U/mL 1,376 High              Component  Ref Range & Units 7/20/24  7:13 AM   Testosterone, Total  ng/dL 317   Comment: This test was developed and its performance characteristics determined by [facility]. It has not been cleared or approved by the  Food and Drug Administration. Reference Range:  Adult Males  >18 years    264 - 916  This [facility] LC/MS-MS method is currently certified by the CDC Hormone Standardization Program (HoST).  Adult male  reference interval is based on a population of healthy nonobese males (BMI <30) between 19 and 39 years old.  Stacey et.al. JCEM 2017,102;3171-3258 PMID: [id number].   Bioavailable Testosterone, S  ng/dL 179   Comment: Reference Range:  Males (40 - 49y): 95 - 350   Bioavailable Testosterone, %  % 56.5   Sex Hormone Binding Globulin  nmol/L 22.7     IMAGING:   Thyroid ultrasound results:  US THYROID (CPT=76536)    Result Date: 8/8/2024  CONCLUSION:   1. Heterogeneous thyroid enlargement without intra thyroid mass or defined intra thyroid nodule, findings are consistent with chronic thyroid disease.  2. Nodules are seen external to the thyroid gland as described above, may reflect prominent parathyroid glands versus lymph nodes.  Suggest correlation with parathyroid hormone studies, and suggest continued clinical and ultrasound follow-up.    ACR TI-RADS recommendations: TR5 - FNA if greater than or equal to 1 cm, follow-up if 0.5-0.9 cm every year for 5 years. TR4 - FNA if greater than or equal to 1.5 cm, follow-up if 1-1.4 cm in 1, 2, 3 and 5 years. TR3 - FNA if greater than or equal to 2.5 cm, follow-up if 1.5-2.4 cm in 1, 3 and 5 years TR1 and TR2 - no FNA or follow-up  Please note ACR TI-RADS recommendations are based on imaging features and size of the nodule only.  In certain clinical circumstances additional or alternative evaluation may be indicated.   LOCATION:  [id number]        Dictated by (CST): Jeremie Schaeffer MD on 8/08/2024 at 1:23 PM     Finalized by (CST): Jeremie Schaeffer MD on 8/08/2024 at 1:26 PM         Assessment & Plan:      ICD-10-CM    1. Hypothyroidism due to Hashimoto thyroiditis  E06.3       2. Chronic fatigue  R53.82       3. Low testosterone  R79.89             Very pleasant 44 year old male  with PMHx Hashimoto's presenting with a variety of symptoms and to discuss low testosterone and hypothyroidism.     #Hypothyroidism due to Hashimoto thyroiditis  (primary encounter diagnosis)  Plan:   Hx of Hashimoto's, confirmed with antibody testing 5/2024, diagnosed in 7/2020; subclinical hypothyroidism present since ~2016 (age 36) on lab work.  Initially presented with symptoms including weight gain, puffiness, dry eyes, fatigue, and bloating.  Symptoms have improved since switching to brand-name Levoxyl.  He no longer feels fatigue during the day, but now feels some insomnia and anxiety at night.  We discussed that this may be more related to his high stress lifestyle rather than the thyroid function as his TFTs look appropriately normal.  We reviewed some stress management strategies (meditation, journaling, reduce caffeine intake), we will repeat labs before next appointment.      - TFTs q6mo  - Continue Levoxyl 88 mcg daily  - reduce caffeine, try green tea for anxiety/insomnia sx's  - Pt aware that if symptoms do not improve despite normalization of thyroid lab, that they are not likely related to their thyroid condition and that continued follow up with PCP is indicated.   - proper administration of medication discussed with the pt, ideally first thing in the morning, on an empty stomach and taken only with water, separate from all other foods/beverages/medications by 30-60 minutes  -Thyroid ultrasound 8/2024 with heterogenous thyroid tissue, c/w chronic thyroid disease    #Chronic fatigue  Plan:  Reviewed that fatigue is often multifactorial and we have reviewed the common etiologies, both endocrine and non-endocrine.  - thyroid management plan as noted above  - no anemia per CBC   - pt reports snoring/TOSHIA --sleep study workup was negative  - vit D-within normal range July 2024  - vit B12 -normal July 2024  - reviewed if all above normal, pt will continue to follow up with PCP      #Low  testosterone  Plan:   Presented w/ symptoms of low testosterone including fatigue, low libido. Previously no issues with fertility.  Serum total testosterone was 210 on testing 6/2024; this result was completed at 8am and fasting.  This was in the context of abnormal thyroid labs.  After optimizing levothyroxine dose, repeat testing total T was 310.  At this time-- total testosterone does not warrant replacement.  Will continue to optimize hypothyroidism management, as primary hypothyroidism can cause hypogonadism due to elevated TRH.  I suspect his testosterone result normalized with his dose optimization of levothyroxine done around June 2024. Workup for TOSHIA was negative.    -Thyroid management as above  - Repeat total testosterone pending, will get back to the patient with results    No follow-ups on file.    11/8/2024  QUINCY Treadwell

## 2024-11-08 ENCOUNTER — TELEMEDICINE (OUTPATIENT)
Facility: CLINIC | Age: 44
End: 2024-11-08
Payer: COMMERCIAL

## 2024-11-08 VITALS — WEIGHT: 210 LBS | BODY MASS INDEX: 29.4 KG/M2 | HEIGHT: 71 IN

## 2024-11-08 DIAGNOSIS — R53.82 CHRONIC FATIGUE: ICD-10-CM

## 2024-11-08 DIAGNOSIS — E06.3 HYPOTHYROIDISM DUE TO HASHIMOTO THYROIDITIS: Primary | ICD-10-CM

## 2024-11-08 DIAGNOSIS — R79.89 LOW TESTOSTERONE: ICD-10-CM

## 2024-11-08 PROCEDURE — 99214 OFFICE O/P EST MOD 30 MIN: CPT

## 2024-11-08 NOTE — PATIENT INSTRUCTIONS
Follow up with QUINCY Treadwell: Return in about 6 months (around 5/8/2025) for thyroid follow up, can be video visit.    Medication:  - for now, continue: Thryoid Meds: Levoxyl Tabs - 88 MCG  - repeat labs        Understanding thyroid labs:  - TSH = thyroid stimulating hormone, this is the 'signal' which turns UP or DOWN depending on how much hormone your thyroid gland is producing.    - free T4, total T3 = the thyroid hormones in your blood, this is the thyroid number you can \"Feel\"- if T4 is low, that indicates hypothyroidism, if T4 is high, that indicates hyperthyroidism    [facility] - QUINCY Treadwell  Office phone number: [phone]  Fax number: [phone]

## 2024-11-18 LAB
BIOAVAILABLE TESTOSTERONE, %: 46.8 %
BIOAVAILABLE TESTOSTERONE: 149 NG/DL
SEX HORMONE BINDING GLOBULIN: 29.1 NMOL/L
TESTOSTERONE, TOTAL: 318 NG/DL

## 2024-11-25 DIAGNOSIS — E06.3 HYPOTHYROIDISM DUE TO HASHIMOTO THYROIDITIS: ICD-10-CM

## 2024-11-26 RX ORDER — LEVOTHYROXINE SODIUM 88 UG/1
88 TABLET ORAL
Qty: 90 TABLET | Refills: 1 | Status: SHIPPED | OUTPATIENT
Start: 2024-11-26

## 2024-11-26 NOTE — TELEPHONE ENCOUNTER
Endocrine refill protocol for medications for hypothyroidism and hyperthyroidism    Protocol Criteria:  PASSED Reason: N/A    If all below requirements are met, send a 90-day supply with 1 refill per provider protocol.    Verify appointment with Endocrinology completed in the last 12 months or scheduled in the next 6 months.    Normal TSH result in the past 12 months   Review recent telephone encounters and mychart communications with patient to ensure a dose change has not occurred since last office visit that was not updated in the medication history list     Last completed office visit:11/8/2024 Toshia Salas APN   Next scheduled Follow up:   Future Appointments   Date Time Provider Department Center   5/2/2025  9:45 AM Toshia Salas APN [facility]      Last TSH result:   TSH   Date Value Ref Range Status   11/06/2024 3.106 0.550 - 4.780 uIU/mL Final   06/17/2023 4.49 0.40 - 4.50 mIU/L Final       Last office note: - Continue Levoxyl 88 mcg daily     Passed and ordered per protocol

## 2025-01-26 ENCOUNTER — PATIENT MESSAGE (OUTPATIENT)
Facility: CLINIC | Age: 45
End: 2025-01-26

## 2025-02-09 ENCOUNTER — OFFICE VISIT (OUTPATIENT)
Dept: FAMILY MEDICINE CLINIC | Facility: CLINIC | Age: 45
End: 2025-02-09
Payer: COMMERCIAL

## 2025-02-09 VITALS
HEART RATE: 85 BPM | RESPIRATION RATE: 18 BRPM | OXYGEN SATURATION: 99 % | TEMPERATURE: 98 F | BODY MASS INDEX: 29.4 KG/M2 | HEIGHT: 71 IN | WEIGHT: 210 LBS | DIASTOLIC BLOOD PRESSURE: 72 MMHG | SYSTOLIC BLOOD PRESSURE: 133 MMHG

## 2025-02-09 DIAGNOSIS — J06.9 VIRAL URI WITH COUGH: ICD-10-CM

## 2025-02-09 DIAGNOSIS — R05.9 COUGH, UNSPECIFIED TYPE: Primary | ICD-10-CM

## 2025-02-09 DIAGNOSIS — Z87.09 HX OF EXTRINSIC ASTHMA: ICD-10-CM

## 2025-02-09 PROCEDURE — 87637 SARSCOV2&INF A&B&RSV AMP PRB: CPT | Performed by: NURSE PRACTITIONER

## 2025-02-09 RX ORDER — ALBUTEROL SULFATE 90 UG/1
2 INHALANT RESPIRATORY (INHALATION) EVERY 4 HOURS PRN
Qty: 1 EACH | Refills: 0 | Status: SHIPPED | OUTPATIENT
Start: 2025-02-09

## 2025-02-09 NOTE — PROGRESS NOTES
CHIEF COMPLAINT:     Chief Complaint   Patient presents with    Cold     Started 3 days   Low grade        HPI:   Cody Oliva is a 44 year old male who presents for upper respiratory symptoms for  3 days. Patient reports sore throat, congestion, low grade fever, dry cough. Symptoms have been slightly worse since onset.  Treating symptoms with OTC meds.   Associated symptoms include fatigue.     Current Outpatient Medications   Medication Sig Dispense Refill    albuterol (PROAIR HFA) 108 (90 Base) MCG/ACT Inhalation Aero Soln Inhale 2 puffs into the lungs every 4 (four) hours as needed. 1 each 0    LEVOXYL 88 MCG Oral Tab Take 1 tablet (88 mcg total) by mouth before breakfast. 90 tablet 1    albuterol (PROAIR HFA) 108 (90 Base) MCG/ACT Inhalation Aero Soln Inhale 2 puffs into the lungs every 4 (four) hours as needed. 8.5 g 0      Past Medical History:    Allergic rhinitis    Fall    Anxiety    COVID-19 virus infection    Exercise-induced asthma (HCC)    History of genital warts    Hypothyroidism      Past Surgical History:   Procedure Laterality Date    Other surgical history Left 07/25/2022    Left rotator cuff repair with biceps tenodesis         Social History     Socioeconomic History    Marital status:    Tobacco Use    Smoking status: Never     Passive exposure: Never    Smokeless tobacco: Never   Vaping Use    Vaping status: Never Used   Substance and Sexual Activity    Alcohol use: Yes     Alcohol/week: 3.0 - 5.0 standard drinks of alcohol     Types: 3 - 5 Shots of liquor per week     Comment: weekends    Drug use: No    Sexual activity: Yes   Other Topics Concern    Caffeine Concern No     Comment: 3 servings    Exercise Yes    Seat Belt Yes    Special Diet No    Stress Concern Yes    Weight Concern No     Social Drivers of Health      Received from [facility], [facility]    Housing Stability         REVIEW OF SYSTEMS:   GENERAL: feels well otherwise,    ok appetite  SKIN: no rashes or abnormal skin lesions  HEENT: See HPI  LUNGS: denies shortness of breath or wheezing, See HPI  CARDIOVASCULAR: denies chest pain or palpitations   GI: denies N/V/C or abdominal pain  NEURO: Denies headaches    EXAM:   /72   Pulse 85   Temp 97.8 °F (36.6 °C)   Resp 18   Ht 5' 11\" (1.803 m)   Wt 210 lb (95.3 kg)   SpO2 99%   BMI 29.29 kg/m²   GENERAL: well developed, well nourished,in no apparent distress  SKIN: no rashes,no suspicious lesions  HEAD: atraumatic, normocephalic.  no tenderness on palpation of maxillary or frontal sinuses  EYES: conjunctiva clear, EOM intact  EARS: TM's pearly, no  bulging, no retraction,no  fluid, bony landmarks visualized  NOSE: Nostrils patent, clear nasal discharge, nasal mucosa pink and moist  THROAT: Oral mucosa pink, moist. Posterior pharynx is mildly erythematous. no exudates. Tonsils 1/4.    NECK: Supple, non-tender  LUNGS: clear to auscultation bilaterally, no wheezes or rhonchi.  No crackles/rales, good air movement throughout. Breathing is non labored.  CARDIO: RRR without murmur  EXTREMITIES: no cyanosis, clubbing or edema  LYMPH:  No cerical lymphadenopathy.        ASSESSMENT AND PLAN:   Cody Oliva is a 44 year old male who presents with     ASSESSMENT:   Encounter Diagnoses   Name Primary?    Cough, unspecified type Yes    Viral URI with cough     Hx of extrinsic asthma        PLAN:  Discussed viral vs bacterial etiology of URIs, including pharyngitis, laryngitis, bronchitis and sinus congestion/pain. Patient was informed that antibiotics are not effective for treating viral ailments and can result in antibiotic resistence. Reviewed symptom relief measures with patient. Patient is  amenable to symptom relief measures. Meds as below, in case develop any asthma symptoms as has hx. .  Mucinex to help thin secretions.    Follow up with PCP if no improvement in 3-5 days, sooner if worsening.  If any sob/wheezing seek  emergent care.Comfort care as described in Patient Instructions    Meds & Refills for this Visit:  Requested Prescriptions     Signed Prescriptions Disp Refills    albuterol (PROAIR HFA) 108 (90 Base) MCG/ACT Inhalation Aero Soln 1 each 0     Sig: Inhale 2 puffs into the lungs every 4 (four) hours as needed.       Risks, benefits, and side effects of medication explained and discussed.    There are no Patient Instructions on file for this visit.    The patient indicates understanding of these issues and agrees to the plan.  The patient is asked to return if sx's persist or worsen.

## 2025-02-10 LAB
FLUAV + FLUBV RNA SPEC NAA+PROBE: NOT DETECTED
FLUAV + FLUBV RNA SPEC NAA+PROBE: NOT DETECTED
RSV RNA SPEC NAA+PROBE: DETECTED
SARS-COV-2 RNA RESP QL NAA+PROBE: NOT DETECTED

## 2025-02-19 ENCOUNTER — PATIENT MESSAGE (OUTPATIENT)
Facility: CLINIC | Age: 45
End: 2025-02-19

## 2025-02-21 ENCOUNTER — PATIENT MESSAGE (OUTPATIENT)
Dept: FAMILY MEDICINE CLINIC | Facility: CLINIC | Age: 45
End: 2025-02-21

## 2025-03-16 ENCOUNTER — PATIENT MESSAGE (OUTPATIENT)
Facility: CLINIC | Age: 45
End: 2025-03-16

## 2025-03-16 DIAGNOSIS — E06.3 HYPOTHYROIDISM DUE TO HASHIMOTO THYROIDITIS: Primary | ICD-10-CM

## 2025-03-16 DIAGNOSIS — R79.89 LOW TESTOSTERONE: ICD-10-CM

## 2025-04-07 ENCOUNTER — TELEPHONE (OUTPATIENT)
Dept: FAMILY MEDICINE CLINIC | Facility: CLINIC | Age: 45
End: 2025-04-07

## 2025-04-07 NOTE — TELEPHONE ENCOUNTER
Received fax from [facility]. Patient scheduled for L shoulder arthroscopy and distal clavicle repair on 7/9/25. Request for pre-op appt after 6/10/25.    Called pt and LVM advising him to call to schedule

## 2025-04-21 ENCOUNTER — TELEPHONE (OUTPATIENT)
Dept: FAMILY MEDICINE CLINIC | Facility: CLINIC | Age: 45
End: 2025-04-21

## 2025-04-26 ENCOUNTER — NURSE ONLY (OUTPATIENT)
Dept: FAMILY MEDICINE CLINIC | Facility: CLINIC | Age: 45
End: 2025-04-26
Payer: COMMERCIAL

## 2025-04-26 DIAGNOSIS — E06.3 HYPOTHYROIDISM DUE TO HASHIMOTO THYROIDITIS: ICD-10-CM

## 2025-04-26 LAB
T4 FREE SERPL-MCNC: 1.4 NG/DL (ref 0.8–1.7)
TSI SER-ACNC: 2.7 UIU/ML (ref 0.55–4.78)

## 2025-04-26 PROCEDURE — 84443 ASSAY THYROID STIM HORMONE: CPT

## 2025-04-26 PROCEDURE — 84439 ASSAY OF FREE THYROXINE: CPT

## 2025-04-30 ENCOUNTER — TELEMEDICINE (OUTPATIENT)
Facility: CLINIC | Age: 45
End: 2025-04-30
Payer: COMMERCIAL

## 2025-04-30 DIAGNOSIS — R53.82 CHRONIC FATIGUE: ICD-10-CM

## 2025-04-30 DIAGNOSIS — R79.89 LOW TESTOSTERONE: ICD-10-CM

## 2025-04-30 DIAGNOSIS — E06.3 HYPOTHYROIDISM DUE TO HASHIMOTO THYROIDITIS: Primary | ICD-10-CM

## 2025-04-30 PROCEDURE — 98006 SYNCH AUDIO-VIDEO EST MOD 30: CPT

## 2025-04-30 RX ORDER — LEVOTHYROXINE SODIUM 88 UG/1
TABLET ORAL
Qty: 78 TABLET | Refills: 1 | Status: SHIPPED | OUTPATIENT
Start: 2025-04-30

## 2025-04-30 NOTE — PATIENT INSTRUCTIONS
Follow up with QUINCY Wilkins: Return in about 4 months (around 2025) for thyroid follow up.    - check testosterone at 8am, fasting, vitamin D      PLAN:  Medication: Thryoid Meds: Levoxyl Tabs - 88 MCG --> take full tablet 6 days/week, on the 7th day take 1/2 tablet        Please complete thyroid blood work (does not need to be fasting):  [] Today/this week  [] 4 weeks  [] 6 weeks  [x] 2 months - end   [] 3 months  [] 4 months  [] 6 months    Imaging/testing:   [] No further imaging/testing needed   [] Uptake and scan ordered   [] Thyroid ultrasound ordered  [] FNA biopsy      To schedule tests:   Call Central [phone] for FNA and uptake and scan scheduling  Thyroid ultrasounds can be scheduled on the [facility].   [facility] is another affordable option in the area to complete ultrasound- you can call them directly to schedule, they will have our order in the system already.  [phone]  Blood tests can be done at any [place] lab location, or at [facility] if that is what you requested.  Most of the labs are walk in meaning you don't need an appointment. You can schedule these on the [facility] a walk in lab to have an appointment ready.  Here are the lab locations: [email or web address]     If you are taking Levothyroxine:  We recommend you take Levothyroxine first thing in the morning and wait for ½-1 hr prior to tea/coffee or breakfast. Separate Levothyroxine from other medications such as Calcium, Multivitamin, iron pills, proton pump inhibitors by at least 4 hrs.     [facility] - QUINCY Wilkins  Office phone number: [phone]  Fax number: [phone]

## 2025-04-30 NOTE — PROGRESS NOTES
[facility] - [facility]medicine    Cody Oliva verbally consents to a Telemedicine (Audio + Video) visit on 4/30/2025. The visit was conducted in a private patient room.      Subjective:    Initial HPI consult in July 2024  Here for consultation on thyroid.  Dx'd with hypothyroidism in 2020, historically managed on levothyroxine. Recently went to PCP for annual physical and was not feeling great - questioning hypothyroidism diagnosis. Thyroid antibodies were added on, came back positive. His TSH was low so his dose was incr'd.  He still felt ongoing fatigue and states he actually felt worse after his dose adjustment, so messaged in about his testosterone (which was also low). He is interested in knowing if he should take testosterone, and generally wants to feel better.  Symptoms as noted below.     Thyroid hx:  Diagnosed with hypothyroidism in 7/2020   (-) Fhx of thyroid disease   Fam hx of thyroid cancer: (-)   History of neck/head radiation: (-)     Thryoid Meds: levothyroxine Tabs - 88 MCG - takes first thing in the morning, 30-45 mins before eating. Doesn't take MVI     SYMPTOMS: Pt endorses: weight gain (\"heaviest he's ever been\"), bloating, can't lose weight despite working out daily. Tired and fatigued. Vision changes- dry eyes. Light sensitivity on computers at work. Puffiness in face.  Occ palpitations/shortness of breath (hx of asthma). Trouble swallowing (which he states he's always had).    Pt denies:  recent illness, weight loss medication use, recent IV contrast for imaging, amiodarone use, and biotin useConstipation, diarrhea.     Re: testosterone:   - He endorses symptoms of fatigue and low libido  - No history of the following: hypothalamic or pituitary tumors/ infiltrative disease, radiation to head and neck region,  recent critical illness, head trauma, weight loss, glucocorticoid, narcotic/pain medications, anabolic steroid use, excessive alcohol use.    + He had normal  growth and development throughout puberty  - He was able to conceive two children, who are ages 12 and 14.  - No history of TOSHIA or DM, but patient does endorse snoring at night. Has to sleep on his back due to a shoulder injury, and snores when laying on back.     Interim hx:  4/30/2025- w/ sarai Walter, re: thyroid.  Video visit   Feeling good energy wise compared to before  Notes itching eyes has gotten better after years of problems   Still some anxiety at nighttime and has a hard time sleeping more than usual  Noting more acid reflux, changing diet  Was off of caffeine totally for 1 month then got back into it  Upcoming L shoulder arthroscopy in July 2025    Thryoid Meds: Levoxyl Tabs - 88mcg daily  Thyroid labs:  Recent Labs     08/24/24  0831 11/06/24  0905 04/26/25  0838   T4F 1.5 1.3 1.4   TSH 2.942 3.106 2.699      Wants vit D checked- can't tolerate due to acid reflux       History/Other:    REVIEW OF SYSTEMS  Ten point review of systems has been performed and is otherwise negative and/or non-contributory, except as described above.    Medications:     Current Outpatient Medications:     LEVOXYL 88 MCG Oral Tab, Take 1 tab daily, on the 7th day take 1/2 tablet., Disp: 78 tablet, Rfl: 1    albuterol (PROAIR HFA) 108 (90 Base) MCG/ACT Inhalation Aero Soln, Inhale 2 puffs into the lungs every 4 (four) hours as needed., Disp: 1 each, Rfl: 0    albuterol (PROAIR HFA) 108 (90 Base) MCG/ACT Inhalation Aero Soln, Inhale 2 puffs into the lungs every 4 (four) hours as needed., Disp: 8.5 g, Rfl: 0     Allergies:   No Known Allergies    Social History:   Social History     Socioeconomic History    Marital status:    Tobacco Use    Smoking status: Never     Passive exposure: Never    Smokeless tobacco: Never   Vaping Use    Vaping status: Never Used   Substance and Sexual Activity    Alcohol use: Yes     Alcohol/week: 3.0 - 5.0 standard drinks of alcohol     Types: 3 - 5 Shots of liquor per week     Comment:  weekends    Drug use: No    Sexual activity: Yes   Other Topics Concern    Caffeine Concern No     Comment: 3 servings    Exercise Yes    Seat Belt Yes    Special Diet No    Stress Concern Yes    Weight Concern No       Medical History:   Past Medical History:    Allergic rhinitis    Fall    Anxiety    COVID-19 virus infection    Exercise-induced asthma (HCC)    History of genital warts    Hypothyroidism         Surgical history:   Past Surgical History:   Procedure Laterality Date    Other surgical history Left 07/25/2022    Left rotator cuff repair with biceps tenodesis       Objective:    PHYSICAL EXAM  Limited due to telemedicine encounter    Constitutional Not in acute distress  Pulmonary: no wheezing heard  No coughing on the phone. Speaking in full sentences   Neurological:  Alert and oriented to person, place and time.   Psychiatric: Normal affect, mood and behavior appropriate    Wt Readings from Last 6 Encounters:   02/09/25 210 lb (95.3 kg)   11/08/24 210 lb (95.3 kg)   09/04/24 214 lb (97.1 kg)   05/18/24 210 lb (95.3 kg)   11/22/23 202 lb (91.6 kg)   06/23/23 207 lb (93.9 kg)       Labs:  Lab Results   Component Value Date    A1C 5.3 07/20/2024      Recent Labs     08/24/24  0831 11/06/24  0905 04/26/25  0838   T4F 1.5 1.3 1.4   TSH 2.942 3.106 2.699      Component      Latest Ref Rng 5/18/2024   ANTI-THYROGLOBULIN      <60 U/mL 253 (H)    ANTI-THYROPEROXIDASE      <60 U/mL 1,376 (H)       Legend:  (H) High    Component      Latest Ref Rng 7/13/2024 7/20/2024 11/6/2024   TESTOSTERONE, TOTAL      ng/dL  317  318    Bioavailable Testosterone, S      ng/dL  179  149    Bioavailable Testosterone, %      %  56.5  46.8    SEX HORMONE BINDING GLOBULIN      nmol/L  22.7  29.1    Free Testost Direct      6.8 - 21.5 pg/mL 5.9 (L)      TESTOSTERONE      264 - 916 ng/dL 210 (L)         Legend:  (L) Low    IMAGING:   Thyroid ultrasound results:  US THYROID (CPT=76536)    Result Date: 8/8/2024  CONCLUSION:   1.  Heterogeneous thyroid enlargement without intra thyroid mass or defined intra thyroid nodule, findings are consistent with chronic thyroid disease.  2. Nodules are seen external to the thyroid gland as described above, may reflect prominent parathyroid glands versus lymph nodes.  Suggest correlation with parathyroid hormone studies, and suggest continued clinical and ultrasound follow-up.    ACR TI-RADS recommendations: TR5 - FNA if greater than or equal to 1 cm, follow-up if 0.5-0.9 cm every year for 5 years. TR4 - FNA if greater than or equal to 1.5 cm, follow-up if 1-1.4 cm in 1, 2, 3 and 5 years. TR3 - FNA if greater than or equal to 2.5 cm, follow-up if 1.5-2.4 cm in 1, 3 and 5 years TR1 and TR2 - no FNA or follow-up  Please note ACR TI-RADS recommendations are based on imaging features and size of the nodule only.  In certain clinical circumstances additional or alternative evaluation may be indicated.   LOCATION:  [id number]        Dictated by (CST): Jeremie Schaeffer MD on 8/08/2024 at 1:23 PM     Finalized by (CST): Jeremie Schaeffer MD on 8/08/2024 at 1:26 PM         Assessment & Plan:      ICD-10-CM    1. Hypothyroidism due to Hashimoto thyroiditis  E06.3 TSH and Free T4     LEVOXYL 88 MCG Oral Tab     CANCELED: TSH and Free T4      2. Low testosterone  R79.89 Testosterone,Total and Weakly Bound w/ SHBG      3. Chronic fatigue  R53.82 Vitamin D [E]          Very pleasant 44 year old male with PMHx Hashimoto's presenting with a variety of symptoms and to discuss low testosterone and hypothyroidism.     #Hypothyroidism due to Hashimoto thyroiditis  (primary encounter diagnosis)  Plan:   Hx of Hashimoto's dx'd 7/2020, +antiTPO/Tg 5/2024.  Initially presented 5/2024 with symptoms including weight gain, puffiness, dry eyes, fatigue, and bloating.  Symptoms have improved since switching to brand-name Levoxyl.  Noting some ongoing insomnia and anxiety at night, wondering if thyroid related- open to reduction in dose.  Will reduce as below.     These Medications Have Changed       Start Taking Instead of    LEVOXYL 88 MCG Oral Tab LEVOXYL 88 MCG Oral Tab    Dosage:  Take 1 tab daily, on the 7th day take 1/2 tablet. Dosage:  Take 1 tablet (88 mcg total) by mouth before breakfast. - Oral            - TFTs in 2mo   - Pt aware that if symptoms do not improve despite normalization of thyroid lab, that they are not likely related to their thyroid condition and that continued follow up with PCP is indicated.   - proper administration of medication discussed with the pt, ideally first thing in the morning, on an empty stomach and taken only with water, separate from all other foods/beverages/medications by 30-60 minutes  -Thyroid ultrasound 8/2024 with heterogenous thyroid tissue, c/w chronic thyroid disease     #Low testosterone  Hx of mildly low (210) testosterone in July 2024 that improved with thyroid dose optimization. Patient requesting repeat testing  - 8am testosterone ordered    #Chronic fatigue  Ongoing fatigue- thyroid management as above to assist w/ insomnia sx's, patient request repeat vit D   - vit D lab ordered, he cannot tolerate replacement w/o getting acid reflux    Return in about 4 months (around 8/30/2025) for thyroid follow up, can be video visit.    4/30/2025  QUINCY Wilkins

## 2025-05-10 ENCOUNTER — NURSE ONLY (OUTPATIENT)
Dept: FAMILY MEDICINE CLINIC | Facility: CLINIC | Age: 45
End: 2025-05-10
Payer: COMMERCIAL

## 2025-05-10 DIAGNOSIS — R53.82 CHRONIC FATIGUE: ICD-10-CM

## 2025-05-10 DIAGNOSIS — R79.89 LOW TESTOSTERONE: ICD-10-CM

## 2025-05-10 DIAGNOSIS — E78.00 HYPERCHOLESTEREMIA: ICD-10-CM

## 2025-05-10 LAB
CHOLEST SERPL-MCNC: 234 MG/DL (ref ?–200)
FASTING PATIENT LIPID ANSWER: YES
HDLC SERPL-MCNC: 53 MG/DL (ref 40–59)
LDLC SERPL CALC-MCNC: 163 MG/DL (ref ?–100)
NONHDLC SERPL-MCNC: 181 MG/DL (ref ?–130)
TRIGL SERPL-MCNC: 102 MG/DL (ref 30–149)
VIT D+METAB SERPL-MCNC: 34.9 NG/ML (ref 30–100)
VLDLC SERPL CALC-MCNC: 20 MG/DL (ref 0–30)

## 2025-05-10 PROCEDURE — 84410 TESTOSTERONE BIOAVAILABLE: CPT

## 2025-05-10 PROCEDURE — 80061 LIPID PANEL: CPT | Performed by: FAMILY MEDICINE

## 2025-05-10 PROCEDURE — 82306 VITAMIN D 25 HYDROXY: CPT

## 2025-05-10 NOTE — PROGRESS NOTES
Patient here today for fasting lipid, and labs for Dr. Covington.  Drawn and sent to lab.  Patient tolerated well.

## 2025-05-16 LAB
SEX HORM BIND GLOB: 20.9 NMOL/L
TESTOST % FREE+WEAK BND: 34.9 %
TESTOST FREE+WEAK BND: 114.4 NG/DL
TESTOSTERONE TOT /MS: 327.7 NG/DL

## 2025-06-21 ENCOUNTER — OFFICE VISIT (OUTPATIENT)
Dept: FAMILY MEDICINE CLINIC | Facility: CLINIC | Age: 45
End: 2025-06-21
Payer: COMMERCIAL

## 2025-06-21 VITALS
HEART RATE: 66 BPM | HEIGHT: 71 IN | RESPIRATION RATE: 16 BRPM | WEIGHT: 211 LBS | DIASTOLIC BLOOD PRESSURE: 72 MMHG | SYSTOLIC BLOOD PRESSURE: 121 MMHG | TEMPERATURE: 99 F | OXYGEN SATURATION: 99 % | BODY MASS INDEX: 29.54 KG/M2

## 2025-06-21 DIAGNOSIS — E78.00 HYPERCHOLESTEREMIA: ICD-10-CM

## 2025-06-21 DIAGNOSIS — J30.1 SEASONAL ALLERGIC RHINITIS DUE TO POLLEN: ICD-10-CM

## 2025-06-21 DIAGNOSIS — Z87.09 HX OF EXTRINSIC ASTHMA: ICD-10-CM

## 2025-06-21 DIAGNOSIS — E06.3 HYPOTHYROIDISM DUE TO HASHIMOTO THYROIDITIS: ICD-10-CM

## 2025-06-21 DIAGNOSIS — M75.112 NONTRAUMATIC INCOMPLETE TEAR OF LEFT ROTATOR CUFF: ICD-10-CM

## 2025-06-21 DIAGNOSIS — K21.9 GASTROESOPHAGEAL REFLUX DISEASE WITHOUT ESOPHAGITIS: ICD-10-CM

## 2025-06-21 DIAGNOSIS — Z01.818 PRE-OP EVALUATION: Primary | ICD-10-CM

## 2025-06-21 DIAGNOSIS — M25.512 CHRONIC LEFT SHOULDER PAIN: ICD-10-CM

## 2025-06-21 DIAGNOSIS — G89.29 CHRONIC LEFT SHOULDER PAIN: ICD-10-CM

## 2025-06-21 LAB
ANION GAP SERPL CALC-SCNC: 10 MMOL/L (ref 0–18)
BASOPHILS # BLD AUTO: 0.05 X10(3) UL (ref 0–0.2)
BASOPHILS NFR BLD AUTO: 1.1 %
BUN BLD-MCNC: 12 MG/DL (ref 9–23)
CALCIUM BLD-MCNC: 9.7 MG/DL (ref 8.7–10.6)
CHLORIDE SERPL-SCNC: 104 MMOL/L (ref 98–112)
CO2 SERPL-SCNC: 26 MMOL/L (ref 21–32)
CREAT BLD-MCNC: 1.07 MG/DL (ref 0.7–1.3)
EGFRCR SERPLBLD CKD-EPI 2021: 89 ML/MIN/1.73M2 (ref 60–?)
EOSINOPHIL # BLD AUTO: 0.29 X10(3) UL (ref 0–0.7)
EOSINOPHIL NFR BLD AUTO: 6.3 %
ERYTHROCYTE [DISTWIDTH] IN BLOOD BY AUTOMATED COUNT: 11.9 %
FASTING STATUS PATIENT QL REPORTED: NO
GLUCOSE BLD-MCNC: 86 MG/DL (ref 70–99)
HCT VFR BLD AUTO: 41.8 % (ref 39–53)
HGB BLD-MCNC: 14.1 G/DL (ref 13–17.5)
IMM GRANULOCYTES # BLD AUTO: 0 X10(3) UL (ref 0–1)
IMM GRANULOCYTES NFR BLD: 0 %
LYMPHOCYTES # BLD AUTO: 2.03 X10(3) UL (ref 1–4)
LYMPHOCYTES NFR BLD AUTO: 43.8 %
MCH RBC QN AUTO: 31.4 PG (ref 26–34)
MCHC RBC AUTO-ENTMCNC: 33.7 G/DL (ref 31–37)
MCV RBC AUTO: 93.1 FL (ref 80–100)
MONOCYTES # BLD AUTO: 0.34 X10(3) UL (ref 0.1–1)
MONOCYTES NFR BLD AUTO: 7.3 %
NEUTROPHILS # BLD AUTO: 1.93 X10 (3) UL (ref 1.5–7.7)
NEUTROPHILS # BLD AUTO: 1.93 X10(3) UL (ref 1.5–7.7)
NEUTROPHILS NFR BLD AUTO: 41.5 %
OSMOLALITY SERPL CALC.SUM OF ELEC: 289 MOSM/KG (ref 275–295)
PLATELET # BLD AUTO: 343 10(3)UL (ref 150–450)
POTASSIUM SERPL-SCNC: 4.5 MMOL/L (ref 3.5–5.1)
RBC # BLD AUTO: 4.49 X10(6)UL (ref 4.3–5.7)
SODIUM SERPL-SCNC: 140 MMOL/L (ref 136–145)
WBC # BLD AUTO: 4.6 X10(3) UL (ref 4–11)

## 2025-06-21 PROCEDURE — 80048 BASIC METABOLIC PNL TOTAL CA: CPT | Performed by: FAMILY MEDICINE

## 2025-06-21 PROCEDURE — 85025 COMPLETE CBC W/AUTO DIFF WBC: CPT | Performed by: FAMILY MEDICINE

## 2025-06-21 RX ORDER — OMEPRAZOLE 20 MG/1
20 CAPSULE, DELAYED RELEASE ORAL
Qty: 30 CAPSULE | Refills: 1 | COMMUNITY
Start: 2025-06-21

## 2025-06-21 NOTE — H&P
Cody Oliva is a 44 year old male   Chief Complaint   Patient presents with    Pre-Op Exam       HPI:   Pt presents for preoperative consultation as requested by Dr. George Almonte.  Patient is scheduled for left shoulder arthroplasty with distal clavicle excision on 7/9/2025 at [facility] under general anesthesia.  Patient initially had left shoulder rotator cuff repair in 2022.  He has been seeing Dr. Almonte for recurring left shoulder pain.  He has had several injections  Patient is unable to sleep on his side due to shoulder pain.  Can only sleep on his back  Wt Readings from Last 6 Encounters:   06/21/25 211 lb (95.7 kg)   02/09/25 210 lb (95.3 kg)   11/08/24 210 lb (95.3 kg)   09/04/24 214 lb (97.1 kg)   05/18/24 210 lb (95.3 kg)   11/22/23 202 lb (91.6 kg)     Body mass index is 29.43 kg/m².     Cholesterol, Total (mg/dL)   Date Value   05/10/2025 234 (H)   05/18/2024 211 (H)   06/19/2021 224 (H)     CHOLESTEROL, TOTAL (mg/dL)   Date Value   06/17/2023 229 (H)   03/18/2023 222 (H)     HDL Cholesterol (mg/dL)   Date Value   05/10/2025 53   05/18/2024 44   06/19/2021 57     HDL CHOLESTEROL (mg/dL)   Date Value   06/17/2023 45   03/18/2023 43     LDL Cholesterol (mg/dL)   Date Value   05/10/2025 163 (H)   05/18/2024 148 (H)   06/19/2021 154 (H)     LDL-CHOLESTEROL (mg/dL (calc))   Date Value   06/17/2023 163 (H)   03/18/2023 154 (H)     AST (U/L)   Date Value   07/20/2024 37 (H)   05/18/2024 26   06/17/2023 24   06/19/2021 24     ALT (U/L)   Date Value   07/20/2024 41   05/18/2024 44   06/17/2023 29   06/19/2021 42      Current Medications[1]  Allergies[2]     Past Medical History[3]   Past Surgical History[4]   Family History[5]     Short Social Hx on File[6]     Specialists: Dr Almonte-[facility] ortho, Dr Llamas -dermatology, Dr Wagner- allergist  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 Children: 2 son and daughter.   Exercise:  walks the dog.yoga for stretching  Diet: watches minimally, one  serving of caffeine per day     REVIEW OF SYSTEMS:   GENERAL: generally feels well, no fatigue, denies excessive daytime drowsiness, good appetite  SKIN: denies any unusual skin lesions or rashes, derm appt 1/14/23 for rosacea, crusting around eyes, resolved with doxycycline  EYES:denies blurred vision or double vision  ENT: denies nasal congestion, PND or ST, denies snoring and reported periods of apnea, denies hearing deficits, getting allergy shots monthly x 1 yr for dog allergy  LUNGS: some SOB ,no coughing ,no wheezing  CARDIOVASCULAR: denies chest pain on exertion,  denies anginal equivalent symptoms, no claudication , no peripheral edema, rare palpitations  GI: denies abdominal pain,denies constipation, diarrhea, or change in bowel habits, + daily heartburn  : denies dysuria, hesitancy,nocturia, decreased urine stream, incontinence, erectile dysfunction, decreased libido   MUSCULOSKELETAL: denies back pain, left shoulder painful to sleep on it, decreased ROM  NEURO: denies headaches, tremors, dizziness, numbness and weakness  PSYCHE: Increased stressors  HEMATOLOGIC: denies unexplained bruising or bleeding  ENDOCRINE: denies heat or cold intolerance , no unexplained wt loss or gain  EXAM:   /72 (BP Location: Right arm, Patient Position: Sitting)   Pulse 66   Temp 98.8 °F (37.1 °C) (Temporal)   Resp 16   Ht 5' 11\" (1.803 m)   Wt 211 lb (95.7 kg)   SpO2 99%   BMI 29.43 kg/m²   Body mass index is 29.43 kg/m².   GENERAL: well developed, well nourished,in no apparent distress  SKIN: no rashes,no suspicious lesions  ENT: EAC:  Clear, TMs: intact, Nose: turbinates normal, septum: midline, discharge: none, Pharynx: class 1 airway  EYES:PERRLA, EOMI, normal optic disk,conjunctiva are clear  NECK: supple,no adenopathy,no bruits, no thyromegaly  LUNGS: clear to auscultation, no w/r/r  CARDIO: RRR without murmur, no S3, S4, strong peripheral pulses, no peripheral edema  GI: +NBS's,no masses, HSM or  tenderness  BACK:  : FROM, No lateral curves  Left shoulder: Flexion to 110 degrees, decreased external rotation, tight posterior capsule, negative Phyllis sign, negative May signs, positive Neer sign at 110 degrees, no significant tenderness over the biceps tendon, negative labral grind  EXTREMITIES: no cyanosis, clubbing or edema, FROM of all joints tested, high medial longitudinal arches, mild hammertoe deformity bilaterally  NEURO: A &O X 3,cranial nerves are intact,motor and sensory are grossly intact, DTRs +2/4 UE/LE bilaterally  PSYCH: affect: slightly anxious, speech: clear and coherent, Insight: appropriate  ASSESSMENT AND PLAN:   Cody Oliva is a 44 year old male   Encounter Diagnoses   Name Primary?    Pre-op evaluation Yes    Nontraumatic incomplete tear of left rotator cuff     Chronic left shoulder pain     Hypothyroidism due to Hashimoto thyroiditis     Hypercholesteremia     Hx of extrinsic asthma     Seasonal allergic rhinitis due to pollen     Gastroesophageal reflux disease without esophagitis      Anti-reflux measures reviewed.  Avoid large meals. Allow at least 3 hrs between eating and laying down to facilitate gastric emptying.  Limit caffeine to 2 servings per day.  Avoid spicy or acidic foods and liquids.  Moderation of alcohol.  Avoid nsaids and aspirin.  May use Tylenol prn pain  Start daily omeprazole 20 mg 30 minutes before evening meal to improve compliance  Patient to avoid all aspirin and NSAID containing products 10 days prior to planned procedure  I have asked patient to review planned procedure including distal clavicle excision prior to procedure  Patient is medically optimized for procedure as scheduled and may proceed with surgery as planned.  A copy of his consultation will be forwarded to Dr. Almonte for review.    Dameon Powell, DO, FAAFP    Orders Placed This Encounter   Procedures    CBC W Differential W Platelet [E]    Basic Metabolic Panel (8) [E]     Meds &  Refills for this Visit:  Requested Prescriptions      No prescriptions requested or ordered in this encounter     Imaging & Consults:  None  No follow-ups on file.  There are no Patient Instructions on file for this visit.           [1]   Current Outpatient Medications   Medication Sig Dispense Refill    LEVOXYL 88 MCG Oral Tab Take 1 tab daily, on the 7th day take 1/2 tablet. 78 tablet 1    albuterol (PROAIR HFA) 108 (90 Base) MCG/ACT Inhalation Aero Soln Inhale 2 puffs into the lungs every 4 (four) hours as needed. 1 each 0   [2] No Known Allergies  [3]   Past Medical History:   Allergic rhinitis    Fall    Anxiety    COVID-19 virus infection    Exercise-induced asthma (HCC)    History of genital warts    Hypothyroidism   [4]   Past Surgical History:  Procedure Laterality Date    Other surgical history Left 07/25/2022    Left rotator cuff repair with biceps tenodesis   [5]   Family History  Problem Relation Age of Onset    Anxiety Mother     Diabetes Father         pre-dm    Other (ulcers) Father     No Known Problems Sister     Lipids Brother     No Known Problems Brother     Other (ECZEMA) Daughter     Heart Disease Son         Pulmonary stenosis; had open heart   [6]   Social History  Socioeconomic History    Marital status:    Tobacco Use    Smoking status: Never     Passive exposure: Never    Smokeless tobacco: Never   Vaping Use    Vaping status: Never Used   Substance and Sexual Activity    Alcohol use: Yes     Alcohol/week: 3.0 - 5.0 standard drinks of alcohol     Types: 3 - 5 Shots of liquor per week     Comment: weekends    Drug use: No    Sexual activity: Yes   Other Topics Concern    Caffeine Concern No     Comment: 3 servings    Exercise Yes    Seat Belt Yes    Special Diet No    Stress Concern Yes    Weight Concern No     Social Drivers of Health      Received from [facility]    Housing Stability

## 2025-06-22 ENCOUNTER — PATIENT MESSAGE (OUTPATIENT)
Facility: CLINIC | Age: 45
End: 2025-06-22

## 2025-08-04 DIAGNOSIS — E06.3 HYPOTHYROIDISM DUE TO HASHIMOTO THYROIDITIS: ICD-10-CM

## 2025-08-04 RX ORDER — LEVOTHYROXINE SODIUM 88 UG/1
TABLET ORAL
Qty: 78 TABLET | Refills: 1 | Status: SHIPPED | OUTPATIENT
Start: 2025-08-04

## 2025-08-15 ENCOUNTER — MED REC SCAN ONLY (OUTPATIENT)
Dept: FAMILY MEDICINE CLINIC | Facility: CLINIC | Age: 45
End: 2025-08-15

## 2025-08-23 ENCOUNTER — NURSE ONLY (OUTPATIENT)
Dept: FAMILY MEDICINE CLINIC | Facility: CLINIC | Age: 45
End: 2025-08-23

## 2025-08-23 DIAGNOSIS — E06.3 HYPOTHYROIDISM DUE TO HASHIMOTO THYROIDITIS: ICD-10-CM

## 2025-08-23 LAB
T4 FREE SERPL-MCNC: 1.5 NG/DL (ref 0.8–1.7)
TSI SER-ACNC: 3.52 UIU/ML (ref 0.55–4.78)

## 2025-08-23 PROCEDURE — 84439 ASSAY OF FREE THYROXINE: CPT

## 2025-08-23 PROCEDURE — 84443 ASSAY THYROID STIM HORMONE: CPT

## (undated) DIAGNOSIS — M75.22 BICEPS TENDONITIS ON LEFT: Primary | ICD-10-CM

## (undated) NOTE — LETTER
ASTHMA ACTION PLAN for Trinity Vivas     : 10/7/1980          Date: 2021    Charlene Craig. Renetta [facility], [address] [address]  [phone] 1.   GREEN - GO!  % Personal Be [] Asthma Action Plan reviewed with patient (and caregiver if necessary) on the phone and mailed copy to patient. Physician Patient Caretaker (if necessary)   Isac Samuels.  Veronika Scott, [address] [place] [address] Road

## (undated) NOTE — LETTER
ASTHMA ACTION PLAN for Cody Oliva     : 10/7/1980          Date: 2024    Dameon Powell, DO  [facility], [facility], [place]  [address] [facility] [address] [phone] 1.  GREEN - GO!  % Personal Best Peak Flow. Use controller medicine.   2.  YELLOW - Caution. 50-79% Personal Best Peak Flow.  Use reliever meds.   3.  RED - Stop. <50% Personal Best Peak Flow.  Get help from a doctor.  SDon’t forget to rinse your mouth after using steroid inhalers.  RRemember to get your Flu vaccine every fall!   ACT Score: 19  ACT Goal: 20 or greater    1. GREEN - Go! Use controller medicine.   Breathing is good, No cough or wheeze, Can work and play Medicine:  No regular controller medications           2. YELLOW - Caution. Take reliever medicine to keep asthma attack from getting bad.   Cough, Wheezing, Tight Chest, Wake up at night Medicine:  Call PCP if no relief or symptoms are worse after 2 treatments  Albuterol Inhaler (Proair):  2 puffs every 4 to 6 hours as needed           3. RED - Stop! Danger! Get help from a doctor now!   Medicine not helping, Breathing hard & fast, Nose opens wide, Can't walk, Ribs show, Can't talk well Medicine:   Call 911 or go to Emergency Room if no relief after 2 treatments or symptoms are worse  Albuterol Inhaler (Proair): 2 puffs every 20 minutes  No rescue medications - Call PCP    If your symptoms do not improve and you cannot contact your doctor, go to the emergency room or call 911 immediately!   Seek medical attention if you require your rescue inhaler/medication more than 2-3 times in a 24 hour period. If you require your rescue inhaler/medication more than 2-3 times weekly, your asthma may not be under proper control and you should contact your healthcare provider.   Please schedule your follow-up asthma appointment today!  [x] Asthma Action Plan reviewed with patient (and caregiver if necessary) and a copy of the plan was  given to the patient/caregiver.   [] Asthma Action Plan reviewed with patient (and caregiver if necessary) on the phone and mailed copy to patient.         Physician Patient Caretaker (if necessary)   DO Cody Merrill

## (undated) NOTE — MR AVS SNAPSHOT
After Visit Summary   5/18/2024    Cody Oliva   MRN: [id number]           Visit Information     Date & Time  5/18/2024  9:00 AM Provider  Dameon Powell [facility] Dept. Phone  [phone]      Your Vitals Were  Most recent update: 5/18/2024  9:08 AM    BP   130/72 (BP Location: Left arm, Patient Position: Sitting, Cuff Size: adult)          Pulse   79          Temp   98 °F (36.7 °C) (Temporal)          Resp   16          Ht   71.65\"             Wt   210 lb    SpO2   98%    BMI   28.76 kg/m²         Allergies as of 5/18/2024  Review status set to Review Complete on 5/18/2024   No Known Allergies     Your Current Medications        Dosage    albuterol (PROAIR HFA) 108 (90 Base) MCG/ACT Inhalation Aero Soln Inhale 2 puffs into the lungs every 4 (four) hours as needed.    levothyroxine 75 MCG Oral Tab Take 1 tablet (75 mcg total) by mouth before breakfast.      Diagnoses for This Visit    Preventative health care   [705844]  -  Primary  Acquired hypothyroidism   [984718]    Vitamin D deficiency   [526138]    Hypercholesteremia   [165810]    History of genital warts   [6050064]    Mild intermittent asthma without complication   [769016]    Medication monitoring encounter   [486967]    Shoulder impingement syndrome, left   [3303579]             We Ordered the Following     Normal Orders This Visit    Comp Metabolic Panel (14) [E] [0033060 CUSTOM]     Lipid Panel [E] [1157628 CUSTOM]     Thyroid peroxidase & thyroglobulin ab [E] [4083471 CPT(R)]     TSH and Free T4 [E] [4409889 CUSTOM]     Future Labs/Procedures Expected by Expires    Comp Metabolic Panel (14) [E] [5576600 CUSTOM]  5/18/2024 (Approximate) 5/18/2025    Lipid Panel [E] [1992647 CUSTOM]  5/18/2024 (Approximate) 5/18/2025    Thyroid peroxidase & thyroglobulin ab [E] [9616637 CPT(R)]  5/18/2024 (Approximate) 5/18/2025    TSH and Free T4 [E] [2515770 CUSTOM]  5/18/2024  (Approximate) 5/18/2025      Instructions    1. Preventative health care  I reviewed age-appropriate preventative health screening exams as well as immunizations.  Patient declines pneumococcal vaccine  Discussed conservative management strategies for sleep onset and maintenance, would avoid excessive alcohol which impairs quality of sleep.  Patient may benefit from melatonin 5 mg 1 hour before bedtime with shift changes  - TSH and Free T4 [E]; Future  - Lipid Panel [E]; Future  - Comp Metabolic Panel (14) [E]; Future  - TSH and Free T4 [E]  - Lipid Panel [E]  - Comp Metabolic Panel (14) [E]    2. Acquired hypothyroidism  Check thyroid function studies as well as antibodies.  Continue thyroid replacement  - TSH and Free T4 [E]; Future  - Thyroid peroxidase & thyroglobulin ab [E]; Future  - TSH and Free T4 [E]  - Thyroid peroxidase & thyroglobulin ab [E]    3. Vitamin D deficiency  Continue vitamin D supplement 5000 IU daily    4. Hypercholesteremia  Reviewed goals for lipids  - Lipid Panel [E]; Future  - Lipid Panel [E]    5. History of genital warts  Monitor clinically    6. Mild intermittent asthma without complication (HCC)  Discussed asthma control test and asthma action plan.  Patient may benefit from a controller such as inhaled corticosteroid or montelukast.  Monitor clinically for more frequent use of rescue inhaler    7. Medication monitoring encounter  Check labs  - TSH and Free T4 [E]; Future  - TSH and Free T4 [E]    8. Shoulder impingement syndrome, left  Patient instructed on proper scapular stabilization, posterior capsule stretches and rotator cuff strengthening exercises                       Did you know that [facility] primary care physicians now offer Video Visits through [employer] for adult patients for a variety of conditions such as allergies, back pain and cold symptoms? Skip the drive and waiting room and online chat with a doctor face-to-face using your web-cam enabled computer or mobile device  wherever you are. Video Visits cost $50 and can be paid hassle-free using a credit, debit, or health savings card.  Not active on [employer]? Ask us how to get signed up today!          If you receive a survey from [employer], please take a few minutes to complete it and provide feedback. We strive to deliver the best patient experience and are looking for ways to make improvements. Your feedback will help us do so. For more information on [employer], please visit [email or web address]           No text in SmartText           No text in SmartText